# Patient Record
Sex: FEMALE | Race: WHITE | NOT HISPANIC OR LATINO | Employment: PART TIME | URBAN - METROPOLITAN AREA
[De-identification: names, ages, dates, MRNs, and addresses within clinical notes are randomized per-mention and may not be internally consistent; named-entity substitution may affect disease eponyms.]

---

## 2023-02-21 LAB
ALANINE AMINOTRANSFERASE (SGPT) (U/L) IN SER/PLAS: 21 U/L (ref 7–45)
ALBUMIN (G/DL) IN SER/PLAS: 4.1 G/DL (ref 3.4–5)
ALKALINE PHOSPHATASE (U/L) IN SER/PLAS: 56 U/L (ref 33–136)
ANION GAP IN SER/PLAS: 11 MMOL/L (ref 10–20)
ASPARTATE AMINOTRANSFERASE (SGOT) (U/L) IN SER/PLAS: 22 U/L (ref 9–39)
BILIRUBIN TOTAL (MG/DL) IN SER/PLAS: 0.4 MG/DL (ref 0–1.2)
CALCIDIOL (25 OH VITAMIN D3) (NG/ML) IN SER/PLAS: 36 NG/ML
CALCIUM (MG/DL) IN SER/PLAS: 9.6 MG/DL (ref 8.6–10.6)
CARBON DIOXIDE, TOTAL (MMOL/L) IN SER/PLAS: 30 MMOL/L (ref 21–32)
CHLORIDE (MMOL/L) IN SER/PLAS: 107 MMOL/L (ref 98–107)
CHOLESTEROL (MG/DL) IN SER/PLAS: 165 MG/DL (ref 0–199)
CHOLESTEROL IN HDL (MG/DL) IN SER/PLAS: 67 MG/DL
CHOLESTEROL/HDL RATIO: 2.5
CREATININE (MG/DL) IN SER/PLAS: 0.71 MG/DL (ref 0.5–1.05)
GFR FEMALE: >90 ML/MIN/1.73M2
GLUCOSE (MG/DL) IN SER/PLAS: 117 MG/DL (ref 74–99)
LDL: 80 MG/DL (ref 0–99)
POTASSIUM (MMOL/L) IN SER/PLAS: 4.6 MMOL/L (ref 3.5–5.3)
PROTEIN TOTAL: 6.6 G/DL (ref 6.4–8.2)
SODIUM (MMOL/L) IN SER/PLAS: 143 MMOL/L (ref 136–145)
TRIGLYCERIDE (MG/DL) IN SER/PLAS: 90 MG/DL (ref 0–149)
UREA NITROGEN (MG/DL) IN SER/PLAS: 11 MG/DL (ref 6–23)
VLDL: 18 MG/DL (ref 0–40)

## 2023-02-24 LAB — FUNGAL SCREEN, YEAST: NORMAL

## 2023-03-11 DIAGNOSIS — I25.10 CORONARY ARTERY DISEASE DUE TO CALCIFIED CORONARY LESION: Primary | ICD-10-CM

## 2023-03-11 DIAGNOSIS — I25.84 CORONARY ARTERY DISEASE DUE TO CALCIFIED CORONARY LESION: Primary | ICD-10-CM

## 2023-03-13 PROBLEM — R82.998 LEUKOCYTES IN URINE: Status: ACTIVE | Noted: 2023-03-13

## 2023-03-13 PROBLEM — L08.9 BACTERIAL SKIN INFECTION: Status: ACTIVE | Noted: 2023-03-13

## 2023-03-13 PROBLEM — R13.12 DYSPHAGIA, OROPHARYNGEAL PHASE: Status: ACTIVE | Noted: 2023-03-13

## 2023-03-13 PROBLEM — K64.8 INTERNAL HEMORRHOIDS: Status: ACTIVE | Noted: 2023-03-13

## 2023-03-13 PROBLEM — K62.89 RECTAL IRRITATION: Status: ACTIVE | Noted: 2023-03-13

## 2023-03-13 PROBLEM — Q67.8 ASYMMETRY OF CHEST: Status: ACTIVE | Noted: 2023-03-13

## 2023-03-13 PROBLEM — R05.9 COUGH: Status: ACTIVE | Noted: 2023-03-13

## 2023-03-13 PROBLEM — N39.46 MIXED STRESS AND URGE URINARY INCONTINENCE: Status: ACTIVE | Noted: 2023-03-13

## 2023-03-13 PROBLEM — K76.0 FATTY LIVER: Status: ACTIVE | Noted: 2023-03-13

## 2023-03-13 PROBLEM — R00.2 PALPITATIONS: Status: ACTIVE | Noted: 2023-03-13

## 2023-03-13 PROBLEM — E55.9 VITAMIN D DEFICIENCY: Status: ACTIVE | Noted: 2023-03-13

## 2023-03-13 PROBLEM — G43.909 HEADACHE, MIGRAINE: Status: ACTIVE | Noted: 2023-03-13

## 2023-03-13 PROBLEM — K29.70 GASTRITIS: Status: ACTIVE | Noted: 2023-03-13

## 2023-03-13 PROBLEM — R31.1 BENIGN MICROSCOPIC HEMATURIA: Status: ACTIVE | Noted: 2023-03-13

## 2023-03-13 PROBLEM — R43.9 DISTURBANCES OF SENSATION OF SMELL AND TASTE: Status: ACTIVE | Noted: 2023-03-13

## 2023-03-13 PROBLEM — R32 URINARY LEAKAGE: Status: ACTIVE | Noted: 2023-03-13

## 2023-03-13 PROBLEM — K57.10 DUODENAL DIVERTICULUM: Status: ACTIVE | Noted: 2023-03-13

## 2023-03-13 PROBLEM — N91.2 AMENORRHEA: Status: ACTIVE | Noted: 2023-03-13

## 2023-03-13 PROBLEM — Z86.16 HISTORY OF COVID-19: Status: ACTIVE | Noted: 2023-03-13

## 2023-03-13 PROBLEM — R10.11 ABDOMINAL PAIN, RUQ: Status: ACTIVE | Noted: 2023-03-13

## 2023-03-13 PROBLEM — G56.03 BILATERAL CARPAL TUNNEL SYNDROME: Status: ACTIVE | Noted: 2023-03-13

## 2023-03-13 PROBLEM — R13.10 ODYNOPHAGIA: Status: ACTIVE | Noted: 2023-03-13

## 2023-03-13 PROBLEM — K63.5 COLON POLYP: Status: ACTIVE | Noted: 2023-03-13

## 2023-03-13 PROBLEM — B37.89 RECTAL CANDIDIASIS: Status: ACTIVE | Noted: 2023-03-13

## 2023-03-13 PROBLEM — B37.0 ORAL CANDIDIASIS: Status: ACTIVE | Noted: 2023-03-13

## 2023-03-13 PROBLEM — K44.9 HIATAL HERNIA: Status: ACTIVE | Noted: 2023-03-13

## 2023-03-13 PROBLEM — K64.4 EXTERNAL HEMORRHOID: Status: ACTIVE | Noted: 2023-03-13

## 2023-03-13 PROBLEM — L73.9 FOLLICULITIS: Status: ACTIVE | Noted: 2023-03-13

## 2023-03-13 PROBLEM — R10.13 ABDOMINAL DISCOMFORT, EPIGASTRIC: Status: ACTIVE | Noted: 2023-03-13

## 2023-03-13 PROBLEM — E78.5 HYPERLIPIDEMIA: Status: ACTIVE | Noted: 2023-03-13

## 2023-03-13 PROBLEM — E66.01 MORBID OBESITY (MULTI): Status: ACTIVE | Noted: 2023-03-13

## 2023-03-13 PROBLEM — M85.80 BORDERLINE OSTEOPENIA: Status: ACTIVE | Noted: 2023-03-13

## 2023-03-13 PROBLEM — R19.00 LEFT FLANK MASS: Status: ACTIVE | Noted: 2023-03-13

## 2023-03-13 PROBLEM — M54.2 NECK PAIN: Status: ACTIVE | Noted: 2023-03-13

## 2023-03-13 PROBLEM — R20.2 PARESTHESIA OF HAND, BILATERAL: Status: ACTIVE | Noted: 2023-03-13

## 2023-03-13 PROBLEM — J02.9 SORE THROAT: Status: ACTIVE | Noted: 2023-03-13

## 2023-03-13 PROBLEM — K59.00 CONSTIPATION, ACUTE: Status: ACTIVE | Noted: 2023-03-13

## 2023-03-13 PROBLEM — K21.9 ACID REFLUX: Status: ACTIVE | Noted: 2023-03-13

## 2023-03-13 PROBLEM — K43.9 VENTRAL HERNIA: Status: ACTIVE | Noted: 2023-03-13

## 2023-03-13 PROBLEM — B96.89 BACTERIAL SKIN INFECTION: Status: ACTIVE | Noted: 2023-03-13

## 2023-03-13 PROBLEM — E74.39 GLUCOSE INTOLERANCE: Status: ACTIVE | Noted: 2023-03-13

## 2023-03-13 PROBLEM — R63.5 WEIGHT GAIN: Status: ACTIVE | Noted: 2023-03-13

## 2023-03-13 PROBLEM — J45.20 ASTHMA, MILD INTERMITTENT (HHS-HCC): Status: ACTIVE | Noted: 2023-03-13

## 2023-03-13 PROBLEM — I25.10 CAD (CORONARY ARTERY DISEASE): Status: ACTIVE | Noted: 2023-03-13
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
## 2023-03-13 PROBLEM — M85.2 HYPEROSTOSIS FRONTALIS INTERNA: Status: ACTIVE | Noted: 2023-03-13

## 2023-03-13 PROBLEM — K14.6 BURNING MOUTH SYNDROME: Status: ACTIVE | Noted: 2023-03-13

## 2023-03-13 PROBLEM — R09.89 CHOKING EPISODE: Status: ACTIVE | Noted: 2023-03-13

## 2023-03-13 PROBLEM — R14.0 ABDOMINAL BLOATING: Status: ACTIVE | Noted: 2023-03-13

## 2023-03-13 RX ORDER — ROSUVASTATIN CALCIUM 20 MG/1
TABLET, COATED ORAL
Qty: 90 TABLET | Refills: 0 | Status: SHIPPED | OUTPATIENT
Start: 2023-03-13 | End: 2023-06-19

## 2023-04-13 RX ORDER — ESOMEPRAZOLE MAGNESIUM 20 MG/1
2 TABLET, DELAYED RELEASE ORAL DAILY
COMMUNITY
End: 2023-08-04 | Stop reason: WASHOUT

## 2023-04-13 RX ORDER — FREMANEZUMAB-VFRM 225 MG/1.5ML
225 INJECTION SUBCUTANEOUS
COMMUNITY
Start: 2023-03-22

## 2023-04-13 RX ORDER — ALBUTEROL SULFATE 90 UG/1
2 AEROSOL, METERED RESPIRATORY (INHALATION) EVERY 4 HOURS PRN
COMMUNITY
Start: 2015-09-17

## 2023-04-13 RX ORDER — EZETIMIBE 10 MG/1
10 TABLET ORAL DAILY
COMMUNITY
End: 2023-04-17

## 2023-04-13 RX ORDER — CLOTRIMAZOLE AND BETAMETHASONE DIPROPIONATE 10; .64 MG/G; MG/G
CREAM TOPICAL
COMMUNITY
Start: 2023-01-26 | End: 2023-08-04 | Stop reason: WASHOUT

## 2023-04-13 RX ORDER — DESOXIMETASONE 2.5 MG/G
CREAM TOPICAL 2 TIMES DAILY
COMMUNITY
Start: 2022-08-16 | End: 2023-08-04 | Stop reason: WASHOUT

## 2023-04-13 RX ORDER — RIZATRIPTAN BENZOATE 10 MG/1
TABLET ORAL
COMMUNITY

## 2023-04-13 RX ORDER — BUDESONIDE AND FORMOTEROL FUMARATE DIHYDRATE 80; 4.5 UG/1; UG/1
2 AEROSOL RESPIRATORY (INHALATION) AS NEEDED
COMMUNITY

## 2023-04-13 RX ORDER — ALBUTEROL SULFATE 0.83 MG/ML
SOLUTION RESPIRATORY (INHALATION)
COMMUNITY

## 2023-04-13 RX ORDER — CHOLECALCIFEROL (VITAMIN D3) 125 MCG
CAPSULE ORAL DAILY
COMMUNITY
Start: 2020-12-22

## 2023-04-17 DIAGNOSIS — E78.2 MIXED HYPERLIPIDEMIA: Primary | ICD-10-CM

## 2023-04-17 RX ORDER — EZETIMIBE 10 MG/1
TABLET ORAL
Qty: 90 TABLET | Refills: 1 | Status: SHIPPED | OUTPATIENT
Start: 2023-04-17 | End: 2023-10-06 | Stop reason: SDUPTHER

## 2023-04-20 ENCOUNTER — OFFICE VISIT (OUTPATIENT)
Dept: PRIMARY CARE | Facility: CLINIC | Age: 63
End: 2023-04-20
Payer: COMMERCIAL

## 2023-04-20 VITALS
BODY MASS INDEX: 36.81 KG/M2 | DIASTOLIC BLOOD PRESSURE: 70 MMHG | SYSTOLIC BLOOD PRESSURE: 134 MMHG | WEIGHT: 217.8 LBS | TEMPERATURE: 96.5 F | HEART RATE: 88 BPM

## 2023-04-20 DIAGNOSIS — R13.12 DYSPHAGIA, OROPHARYNGEAL PHASE: ICD-10-CM

## 2023-04-20 DIAGNOSIS — J45.20 MILD INTERMITTENT ASTHMA WITHOUT COMPLICATION (HHS-HCC): ICD-10-CM

## 2023-04-20 DIAGNOSIS — R22.0 MILD TONGUE SWELLING: Primary | ICD-10-CM

## 2023-04-20 PROCEDURE — 1036F TOBACCO NON-USER: CPT | Performed by: INTERNAL MEDICINE

## 2023-04-20 PROCEDURE — 87102 FUNGUS ISOLATION CULTURE: CPT

## 2023-04-20 PROCEDURE — 99214 OFFICE O/P EST MOD 30 MIN: CPT | Performed by: INTERNAL MEDICINE

## 2023-04-20 NOTE — PROGRESS NOTES
"Subjective   Patient ID: Cynthia Moran is a 62 y.o. female who presents for Snoring (She also has moments where she feels her tongue is swollen. By the end of the day her throat feels like its tightening.).    She reports she feels her tongue is pressing on back of her throat - feels enlarged. Feels worse by evening but she is not sure if that   No issues breathing.   Started when she had COVID in Nov and pneumonia in January. She associates with doing inhalers - nebulizer with albuterol and Symbicort   Now she just uses as needed - only uses symbicort if she gets a cold  She feels inhalers were hitting the tongue. Last night she could not see the bottom of her uvula.   She does not have issues with swallowing.   Remotely she went to speech therapy for swallowing in 2020 for \"choking episodes\" She also had esophageal dilation. She did learn how to swallow with her food in the middle as she learned from speech therapy.  She does feel pills dont go down quite as easily as right after esophageal dilatation.          Review of Systems   Constitutional:  Negative for activity change, appetite change, chills, diaphoresis, fatigue and fever.   HENT:  Positive for sore throat (irritated) and trouble swallowing. Negative for congestion, ear discharge, ear pain, facial swelling, postnasal drip, rhinorrhea and sinus pressure.    Eyes:  Negative for pain, discharge and itching.   Respiratory:  Negative for cough, chest tightness, shortness of breath and wheezing.    Cardiovascular:  Negative for chest pain, palpitations and leg swelling.   Gastrointestinal:  Negative for abdominal pain, blood in stool, constipation, diarrhea, nausea and vomiting.   Endocrine: Negative for cold intolerance, heat intolerance, polydipsia, polyphagia and polyuria.   Genitourinary:  Negative for difficulty urinating, dysuria, flank pain, frequency and hematuria.   Musculoskeletal:  Positive for arthralgias and back pain. Negative for joint " swelling, myalgias and neck pain.   Skin: Negative.    Neurological:  Positive for headaches. Negative for dizziness, syncope, weakness, light-headedness and numbness.   Hematological:  Negative for adenopathy. Does not bruise/bleed easily.   Psychiatric/Behavioral:  Negative for behavioral problems, confusion, dysphoric mood and suicidal ideas. The patient is not nervous/anxious.        Objective   /70   Pulse 88   Temp 35.8 °C (96.5 °F) (Temporal)   Wt 98.8 kg (217 lb 12.8 oz)   BMI 36.81 kg/m²     Physical Exam  Constitutional:       Appearance: Normal appearance. She is obese.   HENT:      Head: Normocephalic and atraumatic.      Right Ear: External ear normal.      Left Ear: External ear normal.      Nose: Nose normal.      Mouth/Throat:      Mouth: Mucous membranes are moist. No oral lesions or angioedema.      Tongue: No lesions.      Pharynx: Oropharynx is clear. Uvula midline. No uvula swelling.   Cardiovascular:      Rate and Rhythm: Normal rate and regular rhythm.      Pulses: Normal pulses.      Heart sounds: Normal heart sounds.   Pulmonary:      Effort: Pulmonary effort is normal.      Breath sounds: Normal breath sounds.   Musculoskeletal:      Cervical back: Normal range of motion. No tenderness.   Lymphadenopathy:      Cervical: No cervical adenopathy.   Skin:     General: Skin is warm and dry.      Findings: No rash.   Neurological:      General: No focal deficit present.      Mental Status: She is alert and oriented to person, place, and time. Mental status is at baseline.   Psychiatric:         Mood and Affect: Mood is anxious.         Thought Content: Thought content normal.         Judgment: Judgment normal.         Assessment/Plan   Problem List Items Addressed This Visit          Respiratory    Asthma, mild intermittent     History of CAP/ asthma exacerbation January 2023  - clinically much improved   - Continue Symbicort 80-4.5 2 inhalation twice a day, rinse mouth after, albuterol  HFA 2 puffs every 4 hours when necessary  - monitored by Dr Cotter                Digestive    Mild tongue swelling - Primary     Time associates with use of inhalers with COVID/ bacterial pneumonia  No significant tongue swelling noted on exam today but will send again culture for fungus  If negative will refer to ENT         Relevant Orders    Fungal Screen, Yeast (Completed)       Other    Dysphagia, oropharyngeal phase     Subtle issues beginning with dysphagia    EGD with esophageal dilation 5/18/20  Advise follow up with GI Dr Lion

## 2023-04-22 PROBLEM — R22.0 MILD TONGUE SWELLING: Status: ACTIVE | Noted: 2023-04-22

## 2023-04-22 ASSESSMENT — ENCOUNTER SYMPTOMS
NERVOUS/ANXIOUS: 0
FLANK PAIN: 0
NAUSEA: 0
POLYPHAGIA: 0
JOINT SWELLING: 0
ACTIVITY CHANGE: 0
EYE PAIN: 0
MYALGIAS: 0
TROUBLE SWALLOWING: 1
RHINORRHEA: 0
NUMBNESS: 0
CHEST TIGHTNESS: 0
SHORTNESS OF BREATH: 0
BRUISES/BLEEDS EASILY: 0
EYE DISCHARGE: 0
CONFUSION: 0
DIFFICULTY URINATING: 0
EYE ITCHING: 0
DIAPHORESIS: 0
CONSTIPATION: 0
DIARRHEA: 0
FACIAL SWELLING: 0
DYSPHORIC MOOD: 0
FREQUENCY: 0
VOMITING: 0
FATIGUE: 0
BLOOD IN STOOL: 0
ABDOMINAL PAIN: 0
DYSURIA: 0
FEVER: 0
WHEEZING: 0
COUGH: 0
APPETITE CHANGE: 0
BACK PAIN: 1
PALPITATIONS: 0
LIGHT-HEADEDNESS: 0
HEMATURIA: 0
CHILLS: 0
SORE THROAT: 1
ADENOPATHY: 0
SINUS PRESSURE: 0
DIZZINESS: 0
HEADACHES: 1
NECK PAIN: 0
POLYDIPSIA: 0
WEAKNESS: 0
ARTHRALGIAS: 1

## 2023-04-22 NOTE — ASSESSMENT & PLAN NOTE
Subtle issues beginning with dysphagia    EGD with esophageal dilation 5/18/20  Advise follow up with GI Dr Lion

## 2023-04-22 NOTE — ASSESSMENT & PLAN NOTE
Time associates with use of inhalers with COVID/ bacterial pneumonia  No significant tongue swelling noted on exam today but will send again culture for fungus  If negative will refer to ENT

## 2023-04-22 NOTE — ASSESSMENT & PLAN NOTE
History of CAP/ asthma exacerbation January 2023  - clinically much improved   - Continue Symbicort 80-4.5 2 inhalation twice a day, rinse mouth after, albuterol HFA 2 puffs every 4 hours when necessary  - monitored by Dr Cotter

## 2023-04-24 LAB — FUNGAL SCREEN, YEAST: NORMAL

## 2023-04-26 LAB
ESTIMATED AVERAGE GLUCOSE FOR HBA1C: NORMAL
HEMOGLOBIN A1C/HEMOGLOBIN TOTAL IN BLOOD: NORMAL
HGB A1C-DATA CONVERSION: NORMAL %

## 2023-06-17 DIAGNOSIS — I25.84 CORONARY ARTERY DISEASE DUE TO CALCIFIED CORONARY LESION: ICD-10-CM

## 2023-06-17 DIAGNOSIS — I25.10 CORONARY ARTERY DISEASE DUE TO CALCIFIED CORONARY LESION: ICD-10-CM

## 2023-06-19 PROBLEM — G89.29 CHRONIC PAIN OF RIGHT KNEE: Status: ACTIVE | Noted: 2021-07-21

## 2023-06-19 PROBLEM — M17.11 PRIMARY OSTEOARTHRITIS OF RIGHT KNEE: Status: ACTIVE | Noted: 2021-07-21

## 2023-06-19 PROBLEM — M25.561 CHRONIC PAIN OF RIGHT KNEE: Status: ACTIVE | Noted: 2021-07-21

## 2023-06-19 RX ORDER — ROSUVASTATIN CALCIUM 20 MG/1
TABLET, COATED ORAL
Qty: 90 TABLET | Refills: 0 | Status: SHIPPED | OUTPATIENT
Start: 2023-06-19 | End: 2023-09-20

## 2023-08-04 ENCOUNTER — OFFICE VISIT (OUTPATIENT)
Dept: PRIMARY CARE | Facility: CLINIC | Age: 63
End: 2023-08-04
Payer: COMMERCIAL

## 2023-08-04 VITALS
WEIGHT: 194.8 LBS | SYSTOLIC BLOOD PRESSURE: 120 MMHG | DIASTOLIC BLOOD PRESSURE: 64 MMHG | BODY MASS INDEX: 33.44 KG/M2 | HEART RATE: 80 BPM

## 2023-08-04 DIAGNOSIS — R35.0 URINARY FREQUENCY: ICD-10-CM

## 2023-08-04 DIAGNOSIS — R09.A2 SENSATION OF LUMP IN THROAT: ICD-10-CM

## 2023-08-04 DIAGNOSIS — K13.29 TONGUE PLAQUE: Primary | ICD-10-CM

## 2023-08-04 DIAGNOSIS — R22.0 MILD TONGUE SWELLING: ICD-10-CM

## 2023-08-04 LAB
APPEARANCE, URINE: CLEAR
BILIRUBIN, URINE: NEGATIVE
BLOOD, URINE: ABNORMAL
CALCIUM OXALATE CRYSTALS, URINE: ABNORMAL /HPF
COLOR, URINE: YELLOW
GLUCOSE, URINE: NEGATIVE MG/DL
KETONES, URINE: NEGATIVE MG/DL
LEUKOCYTE ESTERASE, URINE: ABNORMAL
NITRITE, URINE: NEGATIVE
PH, URINE: 5 (ref 5–8)
PROTEIN, URINE: NEGATIVE MG/DL
RBC, URINE: 1 /HPF (ref 0–5)
SPECIFIC GRAVITY, URINE: 1.01 (ref 1–1.03)
UROBILINOGEN, URINE: <2 MG/DL (ref 0–1.9)
WBC, URINE: 3 /HPF (ref 0–5)

## 2023-08-04 PROCEDURE — 1036F TOBACCO NON-USER: CPT | Performed by: INTERNAL MEDICINE

## 2023-08-04 PROCEDURE — 99214 OFFICE O/P EST MOD 30 MIN: CPT | Performed by: INTERNAL MEDICINE

## 2023-08-04 PROCEDURE — 81001 URINALYSIS AUTO W/SCOPE: CPT

## 2023-08-04 PROCEDURE — 87102 FUNGUS ISOLATION CULTURE: CPT

## 2023-08-04 PROCEDURE — 87086 URINE CULTURE/COLONY COUNT: CPT

## 2023-08-04 PROCEDURE — 87077 CULTURE AEROBIC IDENTIFY: CPT

## 2023-08-04 RX ORDER — ESOMEPRAZOLE MAGNESIUM 40 MG/1
1 CAPSULE, DELAYED RELEASE ORAL DAILY
COMMUNITY
Start: 2023-04-17 | End: 2023-11-14 | Stop reason: SDUPTHER

## 2023-08-04 NOTE — PROGRESS NOTES
"Subjective   Patient ID: Cynthia Moran is a 62 y.o. female who presents for UTI (Symptoms started 1 week ago. Saw discoloration on toilet paper, different sensation when urinating.) and Mass (Feels like she has lump in throat for 1 year.).    She scheduled appointment for a sensation of \"a lump in her throat\"   She feels as if her tongue is swollen/ enlarged and presses on roof of her mouth since she did a lot of inhalers with the COVID.   At the last visit checked for oral fungal which was negative. Denies difficulty swallowing. Does not feel food gets stuck anymore since esophageal dilatation.   She notes on right side of her neck she feels a lump. She noted white mucous in the back of her throat.   She cough on way here with clear phlegm. She doesn't feel the issue of her throat as much since she coughed this phlegm up.   She inhaled smoke and felt burning down her chest.     She had esophageal manometry 6/22/20 with Dr Lion - normal with small hiatal hernia  /20 with Dr Serrano - Normal except mild antral gastritis, 2 cm hiatal hernia, nonbleeding duodenal diverticulum. Esophagus dilated.    She also reports she is developing symptoms concerning for a UTI. She just noted urinary frequency in the last day. No hematuria/ flank or abdominal pain/ fever.    She has been overwhelmed lately. Her  has been diagnosed with a glioblastoma and will be coming home from rehab this upcoming Monday.   She has good social support with her children and friends.     UTI   Associated symptoms include frequency. Pertinent negatives include no chills, flank pain, hematuria, nausea or vomiting.        Review of Systems   Constitutional:  Positive for fatigue. Negative for activity change, appetite change, chills, diaphoresis and fever.   HENT:  Positive for postnasal drip. Negative for trouble swallowing.    Respiratory:  Negative for cough, chest tightness, shortness of breath and wheezing.    Cardiovascular:  " Negative for palpitations and leg swelling.   Gastrointestinal:  Negative for abdominal pain, nausea and vomiting.   Genitourinary:  Positive for frequency. Negative for difficulty urinating, flank pain and hematuria.   Neurological:  Negative for dizziness, tremors, syncope, facial asymmetry and light-headedness.   Psychiatric/Behavioral:  Positive for dysphoric mood. Negative for agitation, behavioral problems, confusion and suicidal ideas. The patient is nervous/anxious.        Objective   /64 (BP Location: Right arm, Patient Position: Sitting)   Pulse 80   Wt 88.4 kg (194 lb 12.8 oz)   BMI 33.44 kg/m²     Physical Exam  Constitutional:       Appearance: Normal appearance. She is obese.   HENT:      Head: Normocephalic and atraumatic.      Right Ear: Tympanic membrane, ear canal and external ear normal.      Left Ear: Tympanic membrane, ear canal and external ear normal.      Mouth/Throat:      Mouth: Mucous membranes are moist. No oral lesions.      Tongue: No lesions. Tongue does not deviate from midline.      Pharynx: Oropharynx is clear. No oropharyngeal exudate, posterior oropharyngeal erythema or uvula swelling.      Comments: +coating on tongue  Eyes:      Pupils: Pupils are equal, round, and reactive to light.   Neck:      Vascular: No carotid bruit.   Cardiovascular:      Rate and Rhythm: Normal rate and regular rhythm.      Pulses: Normal pulses.   Pulmonary:      Effort: Pulmonary effort is normal.      Breath sounds: Normal breath sounds. No wheezing, rhonchi or rales.   Abdominal:      General: Bowel sounds are normal.      Palpations: Abdomen is soft. There is no mass.      Tenderness: There is no abdominal tenderness. There is no right CVA tenderness or left CVA tenderness.   Musculoskeletal:      Cervical back: Neck supple. No rigidity or tenderness.   Lymphadenopathy:      Cervical: No cervical adenopathy.   Skin:     General: Skin is warm and dry.   Neurological:      General: No focal  "deficit present.      Mental Status: She is alert.   Psychiatric:         Mood and Affect: Mood is anxious and depressed. Affect is tearful.         Behavior: Behavior normal.         Cognition and Memory: Cognition normal.         Assessment/Plan   Problem List Items Addressed This Visit       Mild tongue swelling     Time associates with use of inhalers with COVID/ bacterial pneumonia  No significant tongue swelling causing obstruction noted on exam today but will send again culture for fungus  Will also at this point refer to ENT         Tongue plaque - Primary    Relevant Orders    Fungal Screen, Yeast    Urinary frequency     UA with reflex culture ordered          Relevant Orders    Urinalysis with Reflex Microscopic and Culture    Urinalysis Microscopic Only (Completed)    Sensation of lump in throat     No obvious mass on exam  Will check thyroid US  Will also refer to ENT due to this and sensation of \"tongue enlargement\"   Will also refer for EGD based on oral culture          Relevant Orders    Referral to ENT    US thyroid          "

## 2023-08-05 PROBLEM — R35.0 URINARY FREQUENCY: Status: ACTIVE | Noted: 2023-08-05

## 2023-08-05 PROBLEM — R82.998 CALCIUM OXALATE CRYSTALS IN URINE: Status: ACTIVE | Noted: 2023-08-05

## 2023-08-05 PROBLEM — K13.29 TONGUE PLAQUE: Status: ACTIVE | Noted: 2023-08-05

## 2023-08-05 PROBLEM — R09.A2 SENSATION OF LUMP IN THROAT: Status: ACTIVE | Noted: 2023-08-05

## 2023-08-05 LAB — URINE CULTURE: NORMAL

## 2023-08-05 ASSESSMENT — ENCOUNTER SYMPTOMS
DIZZINESS: 0
WHEEZING: 0
ABDOMINAL PAIN: 0
FATIGUE: 1
DIAPHORESIS: 0
ACTIVITY CHANGE: 0
LIGHT-HEADEDNESS: 0
TROUBLE SWALLOWING: 0
FEVER: 0
NAUSEA: 0
APPETITE CHANGE: 0
FACIAL ASYMMETRY: 0
DYSPHORIC MOOD: 1
SHORTNESS OF BREATH: 0
CHEST TIGHTNESS: 0
DIFFICULTY URINATING: 0
FREQUENCY: 1
PALPITATIONS: 0
CONFUSION: 0
NERVOUS/ANXIOUS: 1
COUGH: 0
VOMITING: 0
CHILLS: 0
TREMORS: 0
AGITATION: 0
FLANK PAIN: 0
HEMATURIA: 0

## 2023-08-05 NOTE — ASSESSMENT & PLAN NOTE
"No obvious mass on exam  Will check thyroid US  Will also refer to ENT due to this and sensation of \"tongue enlargement\"   Will also refer for EGD based on oral culture   "

## 2023-08-05 NOTE — ASSESSMENT & PLAN NOTE
Time associates with use of inhalers with COVID/ bacterial pneumonia  No significant tongue swelling causing obstruction noted on exam today but will send again culture for fungus  Will also at this point refer to ENT

## 2023-08-08 ENCOUNTER — TELEPHONE (OUTPATIENT)
Dept: PRIMARY CARE | Facility: CLINIC | Age: 63
End: 2023-08-08

## 2023-08-08 ENCOUNTER — APPOINTMENT (OUTPATIENT)
Dept: PRIMARY CARE | Facility: CLINIC | Age: 63
End: 2023-08-08
Payer: COMMERCIAL

## 2023-08-08 LAB — FUNGAL SCREEN, YEAST: ABNORMAL

## 2023-08-08 NOTE — TELEPHONE ENCOUNTER
----- Message from Venus Constantino MD sent at 8/6/2023  9:24 PM EDT -----  Urine culture negative

## 2023-08-08 NOTE — TELEPHONE ENCOUNTER
----- Message from Venus Constantino MD sent at 8/5/2023  9:35 AM EDT -----  Urinalysis abnormal.  Awaiting culture.  Also on microscopic calcium oxalate crystals noted.  I do not have any history noted of nephrolithiasis but could consider to evaluate for stones or could repeat microscopic urinalysis

## 2023-08-08 NOTE — TELEPHONE ENCOUNTER
Informed patient of results for urinalysis/culture.  Patient wants to re-check microscopic urinalysis.

## 2023-08-08 NOTE — TELEPHONE ENCOUNTER
Informed patient of urinalysis and urine culture results.  Patient will just come in to re-check a microscopic urinalysis.

## 2023-08-09 DIAGNOSIS — B37.0 ORAL CANDIDIASIS: Primary | ICD-10-CM

## 2023-08-09 RX ORDER — NYSTATIN 100000 [USP'U]/ML
5 SUSPENSION ORAL 4 TIMES DAILY
Qty: 280 ML | Refills: 0 | Status: SHIPPED | OUTPATIENT
Start: 2023-08-09 | End: 2023-08-23

## 2023-08-13 PROBLEM — E04.1 RIGHT THYROID NODULE: Status: ACTIVE | Noted: 2023-08-13

## 2023-08-18 ENCOUNTER — CLINICAL SUPPORT (OUTPATIENT)
Dept: PRIMARY CARE | Facility: CLINIC | Age: 63
End: 2023-08-18
Payer: COMMERCIAL

## 2023-08-18 DIAGNOSIS — R82.998 LEUKOCYTES IN URINE: ICD-10-CM

## 2023-08-18 DIAGNOSIS — R10.13 ABDOMINAL DISCOMFORT, EPIGASTRIC: ICD-10-CM

## 2023-08-18 LAB
APPEARANCE, URINE: CLEAR
BILIRUBIN, URINE: NEGATIVE
BLOOD, URINE: ABNORMAL
COLOR, URINE: YELLOW
GLUCOSE, URINE: NEGATIVE MG/DL
HYALINE CASTS, URINE: ABNORMAL /LPF
KETONES, URINE: NEGATIVE MG/DL
LEUKOCYTE ESTERASE, URINE: ABNORMAL
MUCUS, URINE: ABNORMAL /LPF
NITRITE, URINE: NEGATIVE
PH, URINE: 5 (ref 5–8)
PROTEIN, URINE: NEGATIVE MG/DL
RBC, URINE: <1 /HPF (ref 0–5)
SPECIFIC GRAVITY, URINE: 1.02 (ref 1–1.03)
SQUAMOUS EPITHELIAL CELLS, URINE: <1 /HPF
UROBILINOGEN, URINE: <2 MG/DL (ref 0–1.9)
WBC, URINE: 2 /HPF (ref 0–5)

## 2023-08-18 PROCEDURE — 81001 URINALYSIS AUTO W/SCOPE: CPT

## 2023-08-18 PROCEDURE — 87086 URINE CULTURE/COLONY COUNT: CPT

## 2023-08-21 LAB — URINE CULTURE: NORMAL

## 2023-08-29 ENCOUNTER — TELEPHONE (OUTPATIENT)
Dept: PRIMARY CARE | Facility: CLINIC | Age: 63
End: 2023-08-29
Payer: COMMERCIAL

## 2023-08-29 DIAGNOSIS — B37.0 ORAL CANDIDIASIS: Primary | ICD-10-CM

## 2023-08-29 RX ORDER — FLUCONAZOLE 150 MG/1
150 TABLET ORAL ONCE
Qty: 1 TABLET | Refills: 0 | Status: SHIPPED | OUTPATIENT
Start: 2023-08-29 | End: 2023-08-29

## 2023-08-29 NOTE — TELEPHONE ENCOUNTER
Patient is forgetting to use the swish and swallow due to having to remember her husbands med too. Asking if you can give her a pill,       AIDA ambrosio Hts

## 2023-09-20 DIAGNOSIS — I25.10 CORONARY ARTERY DISEASE DUE TO CALCIFIED CORONARY LESION: ICD-10-CM

## 2023-09-20 DIAGNOSIS — I25.84 CORONARY ARTERY DISEASE DUE TO CALCIFIED CORONARY LESION: ICD-10-CM

## 2023-09-20 RX ORDER — ROSUVASTATIN CALCIUM 20 MG/1
TABLET, COATED ORAL
Qty: 90 TABLET | Refills: 0 | Status: SHIPPED | OUTPATIENT
Start: 2023-09-20 | End: 2023-10-06 | Stop reason: SDUPTHER

## 2023-10-04 PROBLEM — R39.89 SUSPECTED UTI: Status: ACTIVE | Noted: 2023-10-04

## 2023-10-04 PROBLEM — R09.A2 GLOBUS SENSATION: Status: ACTIVE | Noted: 2023-10-04

## 2023-10-04 PROBLEM — J35.02 ADENOIDITIS: Status: ACTIVE | Noted: 2023-10-04

## 2023-10-04 PROBLEM — M51.36 LUMBAR DEGENERATIVE DISC DISEASE: Status: ACTIVE | Noted: 2023-10-04

## 2023-10-04 PROBLEM — R73.9 HYPERGLYCEMIA: Status: ACTIVE | Noted: 2023-10-04

## 2023-10-04 PROBLEM — K21.9 CHRONIC GERD: Status: ACTIVE | Noted: 2023-10-04

## 2023-10-04 PROBLEM — J45.909 ALLERGIC ASTHMA (HHS-HCC): Status: ACTIVE | Noted: 2023-10-04

## 2023-10-04 PROBLEM — J39.2 LESION OF NASOPHARYNX: Status: ACTIVE | Noted: 2023-10-04

## 2023-10-04 PROBLEM — M51.369 LUMBAR DEGENERATIVE DISC DISEASE: Status: ACTIVE | Noted: 2023-10-04

## 2023-10-04 PROBLEM — R30.0 DYSURIA: Status: ACTIVE | Noted: 2023-10-04

## 2023-10-04 PROBLEM — K29.50 ANTRAL GASTRITIS: Status: ACTIVE | Noted: 2023-10-04

## 2023-10-04 PROBLEM — J06.9 INFLAMMATORY DISORDER OF UPPER RESPIRATORY TRACT: Status: ACTIVE | Noted: 2023-10-04

## 2023-10-04 PROBLEM — R39.89 URINARY PROBLEM: Status: ACTIVE | Noted: 2023-10-04

## 2023-10-04 PROBLEM — G89.29 OTHER CHRONIC PAIN: Status: ACTIVE | Noted: 2023-10-04

## 2023-10-04 RX ORDER — IBUPROFEN 400 MG/1
TABLET ORAL EVERY 8 HOURS
COMMUNITY
End: 2023-10-06

## 2023-10-06 ENCOUNTER — OFFICE VISIT (OUTPATIENT)
Dept: PRIMARY CARE | Facility: CLINIC | Age: 63
End: 2023-10-06
Payer: COMMERCIAL

## 2023-10-06 VITALS
DIASTOLIC BLOOD PRESSURE: 85 MMHG | TEMPERATURE: 96.7 F | SYSTOLIC BLOOD PRESSURE: 144 MMHG | BODY MASS INDEX: 34.49 KG/M2 | OXYGEN SATURATION: 98 % | WEIGHT: 202 LBS | HEART RATE: 70 BPM | HEIGHT: 64 IN

## 2023-10-06 DIAGNOSIS — E78.5 HYPERLIPIDEMIA, UNSPECIFIED HYPERLIPIDEMIA TYPE: Primary | ICD-10-CM

## 2023-10-06 DIAGNOSIS — I25.84 CORONARY ARTERY DISEASE DUE TO CALCIFIED CORONARY LESION: ICD-10-CM

## 2023-10-06 DIAGNOSIS — E78.2 MIXED HYPERLIPIDEMIA: ICD-10-CM

## 2023-10-06 DIAGNOSIS — I25.10 CORONARY ARTERY DISEASE DUE TO CALCIFIED CORONARY LESION: ICD-10-CM

## 2023-10-06 PROCEDURE — 99214 OFFICE O/P EST MOD 30 MIN: CPT | Performed by: FAMILY MEDICINE

## 2023-10-06 PROCEDURE — 1036F TOBACCO NON-USER: CPT | Performed by: FAMILY MEDICINE

## 2023-10-06 RX ORDER — ROSUVASTATIN CALCIUM 20 MG/1
20 TABLET, COATED ORAL DAILY
Qty: 90 TABLET | Refills: 2 | Status: SHIPPED | OUTPATIENT
Start: 2023-10-06

## 2023-10-06 RX ORDER — EZETIMIBE 10 MG/1
10 TABLET ORAL DAILY
Qty: 90 TABLET | Refills: 2 | Status: SHIPPED | OUTPATIENT
Start: 2023-10-06

## 2023-10-06 ASSESSMENT — LIFESTYLE VARIABLES
AUDIT-C TOTAL SCORE: 3
HOW OFTEN DO YOU HAVE SIX OR MORE DRINKS ON ONE OCCASION: NEVER
HOW OFTEN DO YOU HAVE A DRINK CONTAINING ALCOHOL: 2-3 TIMES A WEEK
SKIP TO QUESTIONS 9-10: 1
HOW MANY STANDARD DRINKS CONTAINING ALCOHOL DO YOU HAVE ON A TYPICAL DAY: 1 OR 2

## 2023-10-06 ASSESSMENT — ENCOUNTER SYMPTOMS
GASTROINTESTINAL NEGATIVE: 1
CARDIOVASCULAR NEGATIVE: 1
CONSTITUTIONAL NEGATIVE: 1
MUSCULOSKELETAL NEGATIVE: 1
RESPIRATORY NEGATIVE: 1

## 2023-10-06 ASSESSMENT — PATIENT HEALTH QUESTIONNAIRE - PHQ9
SUM OF ALL RESPONSES TO PHQ9 QUESTIONS 1 & 2: 0
1. LITTLE INTEREST OR PLEASURE IN DOING THINGS: NOT AT ALL
2. FEELING DOWN, DEPRESSED OR HOPELESS: NOT AT ALL

## 2023-10-06 ASSESSMENT — COLUMBIA-SUICIDE SEVERITY RATING SCALE - C-SSRS
6. HAVE YOU EVER DONE ANYTHING, STARTED TO DO ANYTHING, OR PREPARED TO DO ANYTHING TO END YOUR LIFE?: NO
1. IN THE PAST MONTH, HAVE YOU WISHED YOU WERE DEAD OR WISHED YOU COULD GO TO SLEEP AND NOT WAKE UP?: NO
2. HAVE YOU ACTUALLY HAD ANY THOUGHTS OF KILLING YOURSELF?: NO

## 2023-10-06 NOTE — PROGRESS NOTES
Subjective   Patient ID: Cynthia Moran is a 62 y.o. female who presents for Follow-up (Cholesterol ).  HPI  Review pmshx no acute process  Review of Systems   Constitutional: Negative.    HENT: Negative.     Respiratory: Negative.     Cardiovascular: Negative.    Gastrointestinal: Negative.    Genitourinary: Negative.    Musculoskeletal: Negative.        Objective   Physical Exam  General no acute process no icterus well-hydrated alert active oriented    HEENT normocephalic no palpable tenderness eyes pupils equal reactive light and accommodation extraocular muscles intact no icterus and/or erythema ears benign external auditory canal no gross deformities nose no discharge drainage erythema bleeding throat no erythema.    Heart regular rate and rhythm without S3-S4 or murmur    Lungs clear to auscultation x2 no rales or rhonchi    Abdomen soft nontender nondistended no palpable masses no organomegaly splenomegaly.    Integument no rash no lumps bumps or concerning lesions.    Neurologic no tics tremors or seizures no decreased range of motion or ataxia.    Musculoskeletal good range of motion no gross abnormalities noted  Assessment/Plan

## 2023-10-13 ENCOUNTER — APPOINTMENT (OUTPATIENT)
Dept: PRIMARY CARE | Facility: CLINIC | Age: 63
End: 2023-10-13
Payer: COMMERCIAL

## 2023-11-14 ENCOUNTER — TELEPHONE (OUTPATIENT)
Dept: PRIMARY CARE | Facility: CLINIC | Age: 63
End: 2023-11-14

## 2023-11-14 DIAGNOSIS — K29.40 ATROPHIC GASTRITIS WITHOUT HEMORRHAGE: Primary | ICD-10-CM

## 2023-11-14 RX ORDER — ESOMEPRAZOLE MAGNESIUM 40 MG/1
40 CAPSULE, DELAYED RELEASE ORAL DAILY
Qty: 90 CAPSULE | Refills: 0 | Status: SHIPPED | OUTPATIENT
Start: 2023-11-14 | End: 2024-02-16

## 2023-11-14 NOTE — TELEPHONE ENCOUNTER
Rx Refill Request Telephone Encounter    Name:  Cynthia Moran  :  [id number]  Medication Name:  Esomeprazole  Dose : 40 mg DR  Route : oral  Frequency : once daily  Quantity : 90  Directions : [facility]   Specific Pharmacy location:  [facility]  Date of last appointment:  10-06-23  Date of next appointment:  none  Best number to reach patient:  [phone]

## 2024-02-26 ENCOUNTER — OFFICE VISIT (OUTPATIENT)
Dept: PRIMARY CARE | Facility: CLINIC | Age: 64
End: 2024-02-26
Payer: COMMERCIAL

## 2024-02-26 VITALS
HEART RATE: 65 BPM | WEIGHT: 219 LBS | BODY MASS INDEX: 37.39 KG/M2 | DIASTOLIC BLOOD PRESSURE: 89 MMHG | SYSTOLIC BLOOD PRESSURE: 156 MMHG | OXYGEN SATURATION: 94 % | HEIGHT: 64 IN

## 2024-02-26 DIAGNOSIS — F41.9 ANXIETY: Primary | ICD-10-CM

## 2024-02-26 DIAGNOSIS — R35.89 POLYURIA: ICD-10-CM

## 2024-02-26 LAB
POC APPEARANCE, URINE: CLEAR
POC BILIRUBIN, URINE: NEGATIVE
POC BLOOD, URINE: ABNORMAL
POC COLOR, URINE: YELLOW
POC GLUCOSE, URINE: NEGATIVE MG/DL
POC KETONES, URINE: NEGATIVE MG/DL
POC LEUKOCYTES, URINE: NEGATIVE
POC NITRITE,URINE: NEGATIVE
POC PH, URINE: 5.5 PH
POC PROTEIN, URINE: NEGATIVE MG/DL
POC SPECIFIC GRAVITY, URINE: >=1.03
POC UROBILINOGEN, URINE: 0.2 EU/DL

## 2024-02-26 PROCEDURE — 81002 URINALYSIS NONAUTO W/O SCOPE: CPT | Performed by: FAMILY MEDICINE

## 2024-02-26 PROCEDURE — 99213 OFFICE O/P EST LOW 20 MIN: CPT | Performed by: FAMILY MEDICINE

## 2024-02-26 PROCEDURE — 1036F TOBACCO NON-USER: CPT | Performed by: FAMILY MEDICINE

## 2024-02-26 ASSESSMENT — ENCOUNTER SYMPTOMS
MUSCULOSKELETAL NEGATIVE: 1
CONSTITUTIONAL NEGATIVE: 1
RESPIRATORY NEGATIVE: 1
NEUROLOGICAL NEGATIVE: 1
CARDIOVASCULAR NEGATIVE: 1
FREQUENCY: 1

## 2024-02-26 NOTE — PROGRESS NOTES
Subjective   Patient ID: Cynthia Moran is a 63 y.o. female who presents for Follow-up (Patient needs a return to work letter).  HPI  Patient has a history of recently losing her  some depression and anxiety we talked about support structures she is feels she is well supported at home.  At this point were not can start her on any medication she is to return to work in a month and a half or so she is to try part-time first and see if she can get back to work on a regular basis.  Review of Systems   Constitutional: Negative.    HENT: Negative.     Respiratory: Negative.     Cardiovascular: Negative.    Genitourinary:  Positive for frequency.   Musculoskeletal: Negative.    Neurological: Negative.        Objective   Physical Exam  General no acute process no icterus well-hydrated alert active oriented    HEENT normocephalic no palpable tenderness eyes pupils equal reactive light and accommodation extraocular muscles intact no icterus and/or erythema ears benign external auditory canal no gross deformities nose no discharge drainage erythema bleeding throat no erythema.    Heart regular rate and rhythm without S3-S4 or murmur    Lungs clear to auscultation x2 no rales or rhonchi    Abdomen soft nontender nondistended no palpable masses no organomegaly splenomegaly.    Integument no rash no lumps bumps or concerning lesions.    Neurologic no tics tremors or seizures no decreased range of motion or ataxia.    Musculoskeletal good range of motion no gross abnormalities noted  Assessment/Plan            Lauro Oswald DO 02/26/24 3:36 PM

## 2024-02-29 ENCOUNTER — OFFICE VISIT (OUTPATIENT)
Dept: OTOLARYNGOLOGY | Facility: CLINIC | Age: 64
End: 2024-02-29
Payer: COMMERCIAL

## 2024-02-29 VITALS
SYSTOLIC BLOOD PRESSURE: 144 MMHG | DIASTOLIC BLOOD PRESSURE: 82 MMHG | HEIGHT: 64 IN | TEMPERATURE: 97.4 F | HEART RATE: 69 BPM | WEIGHT: 219 LBS | BODY MASS INDEX: 37.39 KG/M2

## 2024-02-29 DIAGNOSIS — R09.A2 GLOBUS SENSATION: ICD-10-CM

## 2024-02-29 DIAGNOSIS — R09.89 PHLEGM IN THROAT: ICD-10-CM

## 2024-02-29 DIAGNOSIS — R68.2 DRY MOUTH: ICD-10-CM

## 2024-02-29 DIAGNOSIS — R22.0 TONGUE SWELLING: Primary | ICD-10-CM

## 2024-02-29 PROCEDURE — 99213 OFFICE O/P EST LOW 20 MIN: CPT | Performed by: NURSE PRACTITIONER

## 2024-02-29 PROCEDURE — 31575 DIAGNOSTIC LARYNGOSCOPY: CPT | Performed by: NURSE PRACTITIONER

## 2024-02-29 PROCEDURE — 1036F TOBACCO NON-USER: CPT | Performed by: NURSE PRACTITIONER

## 2024-02-29 RX ORDER — MAGNESIUM CARB/ALUMINUM HYDROX 105-160MG
TABLET,CHEWABLE ORAL
Qty: 120 TABLET | Refills: 0 | Status: SHIPPED | OUTPATIENT
Start: 2024-02-29 | End: 2024-04-02

## 2024-02-29 RX ORDER — MAGNESIUM OXIDE 200 MG
TABLET,CHEWABLE ORAL
COMMUNITY
End: 2024-04-02

## 2024-02-29 SDOH — ECONOMIC STABILITY: FOOD INSECURITY: WITHIN THE PAST 12 MONTHS, YOU WORRIED THAT YOUR FOOD WOULD RUN OUT BEFORE YOU GOT MONEY TO BUY MORE.: NEVER TRUE

## 2024-02-29 SDOH — ECONOMIC STABILITY: FOOD INSECURITY: WITHIN THE PAST 12 MONTHS, THE FOOD YOU BOUGHT JUST DIDN'T LAST AND YOU DIDN'T HAVE MONEY TO GET MORE.: NEVER TRUE

## 2024-02-29 ASSESSMENT — PATIENT HEALTH QUESTIONNAIRE - PHQ9
2. FEELING DOWN, DEPRESSED OR HOPELESS: NOT AT ALL
SUM OF ALL RESPONSES TO PHQ9 QUESTIONS 1 AND 2: 0
1. LITTLE INTEREST OR PLEASURE IN DOING THINGS: NOT AT ALL

## 2024-02-29 ASSESSMENT — ENCOUNTER SYMPTOMS
DEPRESSION: 0
LOSS OF SENSATION IN FEET: 0
OCCASIONAL FEELINGS OF UNSTEADINESS: 0

## 2024-02-29 ASSESSMENT — COLUMBIA-SUICIDE SEVERITY RATING SCALE - C-SSRS
2. HAVE YOU ACTUALLY HAD ANY THOUGHTS OF KILLING YOURSELF?: NO
1. IN THE PAST MONTH, HAVE YOU WISHED YOU WERE DEAD OR WISHED YOU COULD GO TO SLEEP AND NOT WAKE UP?: NO
6. HAVE YOU EVER DONE ANYTHING, STARTED TO DO ANYTHING, OR PREPARED TO DO ANYTHING TO END YOUR LIFE?: NO

## 2024-02-29 ASSESSMENT — PAIN SCALES - GENERAL: PAINLEVEL: 0-NO PAIN

## 2024-02-29 NOTE — PROGRESS NOTES
Subjective   Patient ID: Cynthia Moran is a 63 y.o. female who presents for LUMP IN THROAT.    HPI  INITIAL VISIT 9/6/2023:  Feels she has a lump in the throat that has been there for years. She has asthma and when she had COVID she was using a lot of inhalers. This happened the following year as well. In January she has pneumonia. When she was doing the inhalers she thought her tongue was swollen and that she has a lot of mucous in the back of her throat. The mucous is clear after about 2 months ago she coughed up some. It is worse at night. No trouble swallowing. No trouble breathing. No throat pain. When she stressed her voice she gets burning in her chest. She went to Dr. Constantino who did test her positive for strep. She does get burning in the chest at times. She went on Diflucan which helped some. She does get acid reflux and heartburn, has been worse over the past 1-2 months, takes Nexium 20 mg once daily. No voice changes. No fevers, chills, night sweats, or weight loss. Non-smoker.     2/29/2024: Patient here for her throat. She feels the antibiotic helped with some of the pain, but she still feels the mucous in the back of her throat. She has a tonsil stone on the right side. Her symptoms started when she had COVID. Was on a lot of steroids/components.No trouble swallowing. She does feel dry.   Her  recently passed away.     Review of Systems    All other systems have been reviewed and are negative for complaints except for those mentioned in history of present illness, past medical history and problem list.    Objective   Physical Exam    Constitutional: No fever, chills, weight loss or weight gain  General appearance: Appears well, well-nourished, well groomed. No acute distress.    Communication: Normal communication    Psychiatric: Oriented to person, place and time. Normal mood and affect.    Neurologic: Cranial nerves II-XII grossly intact and symmetric bilaterally.    Head and Face:  Head:  Atraumatic with no masses, lesions or scarring.  Face: Normal symmetry. No scars or deformities.  TMJ: Normal, no trismus.    Eyes: Conjunctiva not edematous or erythematous. PERRLA    Right Ear: External inspection of ear with no deformity, scars, or masses. EAC is clear.  TM is intact with no sign of infection, effusion, or retraction.  No perforation seen.     Left Ear: External inspection of ear with no deformity, scars, or masses. EAC is clear.  TM is intact with no sign of infection, effusion, or retraction.  No perforation seen.     Nose: External inspection of nose: No nasal lesions, lacerations or scars. Anterior rhinoscopy with limited visualization past the inferior turbinates. No tenderness on frontal or maxillary sinus palpation.    Oral Cavity/Mouth: Oral cavity and oropharynx mucosa moist and pink. No lesions or masses. Dentition normal. Tonsils appear normal. Uvula is midline. Tongue with no masses or lesions. Tongue with good mobility. The oropharynx is clear.    Neck: Normal appearing, symmetric, trachea midline.     Cardiovascular: Examination of peripheral vascular system shows no clubbing or cyanosis.    Respiratory: No respiratory distress increased work of breathing. Inspection of the chest with symmetric chest expansion and normal respiratory effort.    Skin: No head and neck rashes.    Lymph nodes: No adenopathy.     Laryngoscopy    Date/Time: 2/29/2024 10:28 AM    Performed by: TORY Dietrich  Authorized by: TORY Dietrich    Consent:     Consent obtained:  Verbal  Procedure details:     Medication:  Afrin (4% topical lidocaine)    Instrument: flexible fiberoptic nasal endoscope      Scope location: right nare    Sinus:     Right nasopharynx:       normal      patent    Left nasopharynx:       normal      patent    Right Eustachian tube orifices:       normal      patent    Left Eustachian tube orifices:       normal      patent  Mouth:     Posterior pharyngeal  wall: normal      Oropharynx:       normal      Vallecula:       normal      Base of tongue:       normal      Epiglottis:       normal    Throat:     Right hypopharynx:       normal      Left hypopharynx:       normal      Pyriform sinus:       normal      False vocal cords:       normal      True vocal cords:       normal    Post-procedure details:     Patient tolerance of procedure:  Tolerated well, no immediate complications  Comments:      Mild post cricoid edema.    Assessment/Plan   Diagnoses and all orders for this visit:  Laryngoscopy performed. No evidence of infection. No evidence of nasopharyngeal lesion. Add Gaviscon. Continue Nexium. Follow up with GI, after that we will reassess.   Tongue swelling  Dry mouth  Phlegm in throat  Globus sensation  -     aluminum hydrox-magnesium carb (Gaviscon Extra Strength) 160-105 mg tablet,chewable; Chew 1 tablet after meals and at bedtime.  -     Referral to Gastroenterology; Future    All questions answered to patient satisfaction.        TONA Dietirch-CNP 02/29/24 10:06 AM

## 2024-03-05 ENCOUNTER — DOCUMENTATION (OUTPATIENT)
Dept: OTOLARYNGOLOGY | Facility: CLINIC | Age: 64
End: 2024-03-05
Payer: COMMERCIAL

## 2024-03-05 DIAGNOSIS — R07.0 THROAT BURNING: Primary | ICD-10-CM

## 2024-03-05 RX ORDER — FLUCONAZOLE 100 MG/1
TABLET ORAL
Qty: 8 TABLET | Refills: 0 | Status: SHIPPED | OUTPATIENT
Start: 2024-03-05 | End: 2024-04-02

## 2024-03-05 NOTE — PROGRESS NOTES
Patient calling saying she started taking the Gaviscon and it gave her a lot of burning in her throat. She is concerned this is a fungal infection. Requesting Fluconozole. Sent this to the pharmacy and she will follow up when finished. Also will set up GI appt.

## 2024-04-02 ENCOUNTER — OFFICE VISIT (OUTPATIENT)
Dept: GASTROENTEROLOGY | Facility: HOSPITAL | Age: 64
End: 2024-04-02
Payer: COMMERCIAL

## 2024-04-02 VITALS
BODY MASS INDEX: 37.42 KG/M2 | HEART RATE: 67 BPM | DIASTOLIC BLOOD PRESSURE: 89 MMHG | TEMPERATURE: 95 F | OXYGEN SATURATION: 97 % | SYSTOLIC BLOOD PRESSURE: 140 MMHG | WEIGHT: 218 LBS

## 2024-04-02 DIAGNOSIS — R13.10 ODYNOPHAGIA: ICD-10-CM

## 2024-04-02 DIAGNOSIS — R09.A2 GLOBUS SENSATION: ICD-10-CM

## 2024-04-02 DIAGNOSIS — R13.19 ESOPHAGEAL DYSPHAGIA: Primary | ICD-10-CM

## 2024-04-02 DIAGNOSIS — R12 HEARTBURN: ICD-10-CM

## 2024-04-02 PROCEDURE — 99214 OFFICE O/P EST MOD 30 MIN: CPT | Performed by: NURSE PRACTITIONER

## 2024-04-02 PROCEDURE — 1036F TOBACCO NON-USER: CPT | Performed by: NURSE PRACTITIONER

## 2024-04-02 PROCEDURE — 99204 OFFICE O/P NEW MOD 45 MIN: CPT | Performed by: NURSE PRACTITIONER

## 2024-04-02 SDOH — ECONOMIC STABILITY: FOOD INSECURITY: WITHIN THE PAST 12 MONTHS, THE FOOD YOU BOUGHT JUST DIDN'T LAST AND YOU DIDN'T HAVE MONEY TO GET MORE.: NEVER TRUE

## 2024-04-02 SDOH — ECONOMIC STABILITY: FOOD INSECURITY: WITHIN THE PAST 12 MONTHS, YOU WORRIED THAT YOUR FOOD WOULD RUN OUT BEFORE YOU GOT MONEY TO BUY MORE.: NEVER TRUE

## 2024-04-02 ASSESSMENT — LIFESTYLE VARIABLES
HOW OFTEN DO YOU HAVE A DRINK CONTAINING ALCOHOL: MONTHLY OR LESS
HOW MANY STANDARD DRINKS CONTAINING ALCOHOL DO YOU HAVE ON A TYPICAL DAY: 1 OR 2
AUDIT-C TOTAL SCORE: 2
SKIP TO QUESTIONS 9-10: 0
HOW OFTEN DO YOU HAVE SIX OR MORE DRINKS ON ONE OCCASION: LESS THAN MONTHLY

## 2024-04-02 ASSESSMENT — ENCOUNTER SYMPTOMS
WEAKNESS: 0
ARTHRALGIAS: 0
FATIGUE: 0
LIGHT-HEADEDNESS: 0
BACK PAIN: 0
DYSURIA: 0
SHORTNESS OF BREATH: 0
FREQUENCY: 0
AGITATION: 0
WHEEZING: 0
DIAPHORESIS: 0
NUMBNESS: 0
FEVER: 0
JOINT SWELLING: 0
HALLUCINATIONS: 0
PALPITATIONS: 0
ADENOPATHY: 0
HEMATURIA: 0
COUGH: 0
MYALGIAS: 0
EYE PAIN: 0
CHILLS: 0
PHOTOPHOBIA: 0
SORE THROAT: 0
DIZZINESS: 0
HEADACHES: 0
FLANK PAIN: 0

## 2024-04-02 ASSESSMENT — PATIENT HEALTH QUESTIONNAIRE - PHQ9
SUM OF ALL RESPONSES TO PHQ9 QUESTIONS 1 & 2: 0
2. FEELING DOWN, DEPRESSED OR HOPELESS: NOT AT ALL
1. LITTLE INTEREST OR PLEASURE IN DOING THINGS: NOT AT ALL

## 2024-04-02 ASSESSMENT — PAIN SCALES - GENERAL: PAINLEVEL: 0-NO PAIN

## 2024-04-02 NOTE — LETTER
"April 2, 2024     TONA Dietrich-CNP  [address]  [facility] [address]    Patient: Cynthia Moran   YOB: 1960   Date of Visit: 4/2/2024       Dear TONA Wilcox-CNP:    Thank you for referring Cynthia Moran to me for evaluation. Below are my notes for this consultation.  If you have questions, please do not hesitate to call me. I look forward to following your patient along with you.       Sincerely,     TONA Headley-CNP      CC: No Recipients  ______________________________________________________________________________________    Subjective  Patient ID: Cynthia Moran is a 63 y.o. female who presents for a lump feeling in her throat.     This is a 63 year old WF with history of obesity, migraine headaches, HLD, asthma, and GERD who is presenting to the GI clinic for an initial visit.     History per pt and review of EMR    Of note, she saw Kathy Alvarado CNP in the  GI clinic in 2020.     Reports getting COVID-19 two times (a year apart) within the past few years. Since getting COVID-19, she's been dealing with a globus sensation and a sense of tongue swelling.     On 2/29/24 she  saw ENT where in office laryngoscopy noted mild post cricoid edema, but no mass. She was started on Gaviscon (in addition to her esomeprazole), but she reported mouth burning from it. Out of concern for fungal infection, she was given oral  fluconazole which she reports made her \"50 % better\". She was referred to GI.     Reports heartburn despite taking esomeprazole in the morning (sometimes before eating and sometime after eating).     ROS positive for esophageal dysphagia to solids include peanut butter.     ROS positive for odynophagia.     Reports some recent weight loss which she attributes to not eating as she was caring for her  who recently passed away from a glioblastoma (he was diagnosed on 4/29/23).     She's doing the best she can to cope " with her 's death, but admits to being upset regarding his passing.     Denies abdominal pain, diarrhea, constipation, hematemesis, hematochezia, and melena.     She avoids spicy foods. Does eat chocolate on occasion. Drinks coffee in the morning.     Past medical history:   See above    Past surgical history:   C section x2   Cataract surgery   Left knee arthroscopic surgery    Family history:  No GI cancers    Social history:   Drinks a glass of wine on occasion   Denies use of tobacco  Tried a marijuana gummy recently, but otherwise no illicit drug use   Has 2 sons    [facility] colonoscopy [facility]: one small benign polyp removed and nonbleeding internal hemorrhoids     Hydrogen breath negative for SIBO (2020 [facility])     Esophageal manometry study: normal motility (2020 [facility])     EGD [facility]:   - Normal hypopharynx.   - Normal esophagus. There is no endoscopic evidence of Weiss's esophagus, esophagitis, stricture, ulcerations, varices or mass in the entire esophagus. Biopsies were taken to evaluate for EoE.   - Z-line regular, 35 cm from the incisors.   - 2 cm hiatal hernia.   - Mild antral gastritis. Biopsied  - Incidental non-bleeding duodenal diverticulum. The duodenum was otherwise unremarkable.   - No endoscopic esophageal abnormality to explain patient's dysphagia. Esophagus dilated.     FINAL DIAGNOSIS   A. STOMACH, COLD BIOPSY:   --MILD CHRONIC NONSPECIFIC GASTRITIS, NO HELICOBACTER IDENTIFIED.     B, C. DISTAL AND MID ESOPHAGUS, COLD BIOPSIES:   --SQUAMOUS EPITHELIUM, NO SIGNIFICANT HISTOPATHOLOGICAL ABNORMALITIES.           Review of Systems   Constitutional:  Negative for chills, diaphoresis, fatigue and fever.   HENT:  Negative for congestion, ear pain, hearing loss, sneezing and sore throat.    Eyes:  Negative for photophobia, pain and visual disturbance.   Respiratory:  Negative for cough, shortness of breath and wheezing.    Cardiovascular:  Negative for chest pain, palpitations and leg swelling.    Endocrine: Negative for cold intolerance and heat intolerance.   Genitourinary:  Negative for dysuria, flank pain, frequency and hematuria.   Musculoskeletal:  Negative for arthralgias, back pain, gait problem, joint swelling and myalgias.   Skin:  Negative for rash.   Neurological:  Negative for dizziness, syncope, weakness, light-headedness, numbness and headaches.   Hematological:  Negative for adenopathy.   Psychiatric/Behavioral:  Negative for agitation and hallucinations.        Allergies   Allergen Reactions   • Cefprozil Unknown   • Cephalosporins Hives     ceftin   • Latex Unknown     Current Outpatient Medications   Medication Sig Dispense Refill   • Ajovy Syringe 225 mg/1.5 mL prefilled syringe Inject 1.5 mL (225 mg) under the skin every 30 (thirty) days.     • albuterol 2.5 mg /3 mL (0.083 %) nebulizer solution inhale 3 ml (1 vial) via nebulizer every 6 hours as needed for shortness of breath.     • aspirin 81 mg capsule Take 1 tablet by mouth once daily.     • cholecalciferol (Vitamin D-3) 125 MCG (5000 UT) capsule Take by mouth once daily.     • cyanocobalamin, vitamin B-12, (VITAMIN B-12 ORAL) Take by mouth once daily.     • esomeprazole (NexIUM) 40 mg DR capsule TAKE ONE CAPSULE (40 MG) BY MOUTH ONCE DAILY 90 capsule 0   • ezetimibe (Zetia) 10 mg tablet Take 1 tablet (10 mg) by mouth once daily. 90 tablet 2   • rizatriptan (Maxalt) 10 mg tablet TAKE AT ONSET OF HEADACHE  MAY REPEAT DOSE ONCE  NOT TO EXCEED 2 DAYS PER WEEK.     • rosuvastatin (Crestor) 20 mg tablet Take 1 tablet (20 mg) by mouth once daily. 90 tablet 2   • Symbicort 80-4.5 mcg/actuation inhaler Inhale 2 puffs if needed.     • Ventolin HFA 90 mcg/actuation inhaler Inhale 2 puffs every 4 hours if needed.     • MAGNESIUM OXIDE ORAL Take 250 mg by mouth once daily.       No current facility-administered medications for this visit.        Objective    /89 (BP Location: Right arm, Patient Position: Sitting)   Pulse 67   Temp 35 °C  (95 °F) (Temporal)   Wt 98.9 kg (218 lb)   SpO2 97% Comment: RA  BMI 37.42 kg/m²     Physical Exam  Constitutional:       General: She is not in acute distress.     Appearance: Normal appearance.   HENT:      Head: Normocephalic and atraumatic.   Eyes:      Conjunctiva/sclera: Conjunctivae normal.   Cardiovascular:      Rate and Rhythm: Normal rate and regular rhythm.      Heart sounds: No murmur heard.     No gallop.   Pulmonary:      Effort: Pulmonary effort is normal.      Breath sounds: Normal breath sounds.   Abdominal:      General: Bowel sounds are normal. There is no distension.      Tenderness: There is no abdominal tenderness. There is no guarding.   Musculoskeletal:         General: No swelling or deformity. Normal range of motion.      Cervical back: Normal range of motion. No rigidity.   Skin:     General: Skin is warm and dry.      Coloration: Skin is not jaundiced.      Findings: No lesion or rash.   Neurological:      General: No focal deficit present.      Mental Status: She is alert and oriented to person, place, and time.   Psychiatric:         Mood and Affect: Mood normal.      Comments: Tearful at times when discussing 's death; otherwise normal affect          Assessment/Plan  Problem List Items Addressed This Visit       Odynophagia    Relevant Orders    EGD    Globus sensation    Relevant Orders    EGD     Other Visit Diagnoses       Esophageal dysphagia    -  Primary    Relevant Orders    EGD    Heartburn               Globus sensation, subjective tongue swelling, heartburn, odynophagia, esophageal dysphagia to solids: etiology not entirely clear. History somewhat vague. Potentially multifactorial in nature. Suspect GERD in the setting of improper PPI use is at least somewhat contributing. Also consider esophagitis, peptic stricture and esophageal malignancy with the latter less likely. I could not appreciate any tongue swelling on exam. I do question some functional component to  her symptomatology, especially globus sensation,  in the setting of stress regarding her 's recent death.   - will proceed with EGD   - continue esomeprazole 40 mg once daily (advise to take 30-60 minutes prior to breakfast)  - discuss dietary precautions and weight loss in the management of GERD     2. Colorectal cancer screening:  - recommend screening colonoscopy in 2029    3. Follow up:  - will be based upon the above

## 2024-04-02 NOTE — PROGRESS NOTES
"Subjective   Patient ID: Cynthia Moran is a 63 y.o. female who presents for a lump feeling in her throat.     This is a 63 year old WF with history of obesity, migraine headaches, HLD, asthma, and GERD who is presenting to the GI clinic for an initial visit.     History per pt and review of EMR    Of note, she saw Kathy Alvarado CNP in the  GI clinic in 2020.     Reports getting COVID-19 two times (a year apart) within the past few years. Since getting COVID-19, she's been dealing with a globus sensation and a sense of tongue swelling.     On 2/29/24 she  saw ENT where in office laryngoscopy noted mild post cricoid edema, but no mass. She was started on Gaviscon (in addition to her esomeprazole), but she reported mouth burning from it. Out of concern for fungal infection, she was given oral  fluconazole which she reports made her \"50 % better\". She was referred to GI.     Reports heartburn despite taking esomeprazole in the morning (sometimes before eating and sometime after eating).     ROS positive for esophageal dysphagia to solids include peanut butter.     ROS positive for odynophagia.     Reports some recent weight loss which she attributes to not eating as she was caring for her  who recently passed away from a glioblastoma (he was diagnosed on 4/29/23).     She's doing the best she can to cope with her 's death, but admits to being upset regarding his passing.     Denies abdominal pain, diarrhea, constipation, hematemesis, hematochezia, and melena.     She avoids spicy foods. Does eat chocolate on occasion. Drinks coffee in the morning.     Past medical history:   See above    Past surgical history:   C section x2   Cataract surgery   Left knee arthroscopic surgery    Family history:  No GI cancers    Social history:   Drinks a glass of wine on occasion   Denies use of tobacco  Tried a marijuana gummy recently, but otherwise no illicit drug use   Has 2 sons    Screening colonoscopy 2019 : " one small benign polyp removed and nonbleeding internal hemorrhoids     Hydrogen breath negative for SIBO (2020 [facility])     Esophageal manometry study: normal motility (2020 [facility])     EGD [facility]:   - Normal hypopharynx.   - Normal esophagus. There is no endoscopic evidence of Weiss's esophagus, esophagitis, stricture, ulcerations, varices or mass in the entire esophagus. Biopsies were taken to evaluate for EoE.   - Z-line regular, 35 cm from the incisors.   - 2 cm hiatal hernia.   - Mild antral gastritis. Biopsied  - Incidental non-bleeding duodenal diverticulum. The duodenum was otherwise unremarkable.   - No endoscopic esophageal abnormality to explain patient's dysphagia. Esophagus dilated.     FINAL DIAGNOSIS   A. STOMACH, COLD BIOPSY:   --MILD CHRONIC NONSPECIFIC GASTRITIS, NO HELICOBACTER IDENTIFIED.     B, C. DISTAL AND MID ESOPHAGUS, COLD BIOPSIES:   --SQUAMOUS EPITHELIUM, NO SIGNIFICANT HISTOPATHOLOGICAL ABNORMALITIES.           Review of Systems   Constitutional:  Negative for chills, diaphoresis, fatigue and fever.   HENT:  Negative for congestion, ear pain, hearing loss, sneezing and sore throat.    Eyes:  Negative for photophobia, pain and visual disturbance.   Respiratory:  Negative for cough, shortness of breath and wheezing.    Cardiovascular:  Negative for chest pain, palpitations and leg swelling.   Endocrine: Negative for cold intolerance and heat intolerance.   Genitourinary:  Negative for dysuria, flank pain, frequency and hematuria.   Musculoskeletal:  Negative for arthralgias, back pain, gait problem, joint swelling and myalgias.   Skin:  Negative for rash.   Neurological:  Negative for dizziness, syncope, weakness, light-headedness, numbness and headaches.   Hematological:  Negative for adenopathy.   Psychiatric/Behavioral:  Negative for agitation and hallucinations.        Allergies   Allergen Reactions    Cefprozil Unknown    Cephalosporins Hives     ceftin    Latex Unknown     Current  Outpatient Medications   Medication Sig Dispense Refill    Ajovy Syringe 225 mg/1.5 mL prefilled syringe Inject 1.5 mL (225 mg) under the skin every 30 (thirty) days.      albuterol 2.5 mg /3 mL (0.083 %) nebulizer solution inhale 3 ml (1 vial) via nebulizer every 6 hours as needed for shortness of breath.      aspirin 81 mg capsule Take 1 tablet by mouth once daily.      cholecalciferol (Vitamin D-3) 125 MCG (5000 UT) capsule Take by mouth once daily.      cyanocobalamin, vitamin B-12, (VITAMIN B-12 ORAL) Take by mouth once daily.      esomeprazole (NexIUM) 40 mg DR capsule TAKE ONE CAPSULE (40 MG) BY MOUTH ONCE DAILY 90 capsule 0    ezetimibe (Zetia) 10 mg tablet Take 1 tablet (10 mg) by mouth once daily. 90 tablet 2    rizatriptan (Maxalt) 10 mg tablet TAKE AT ONSET OF HEADACHE  MAY REPEAT DOSE ONCE  NOT TO EXCEED 2 DAYS PER WEEK.      rosuvastatin (Crestor) 20 mg tablet Take 1 tablet (20 mg) by mouth once daily. 90 tablet 2    Symbicort 80-4.5 mcg/actuation inhaler Inhale 2 puffs if needed.      Ventolin HFA 90 mcg/actuation inhaler Inhale 2 puffs every 4 hours if needed.      MAGNESIUM OXIDE ORAL Take 250 mg by mouth once daily.       No current facility-administered medications for this visit.        Objective     /89 (BP Location: Right arm, Patient Position: Sitting)   Pulse 67   Temp 35 °C (95 °F) (Temporal)   Wt 98.9 kg (218 lb)   SpO2 97% Comment: RA  BMI 37.42 kg/m²     Physical Exam  Constitutional:       General: She is not in acute distress.     Appearance: Normal appearance.   HENT:      Head: Normocephalic and atraumatic.   Eyes:      Conjunctiva/sclera: Conjunctivae normal.   Cardiovascular:      Rate and Rhythm: Normal rate and regular rhythm.      Heart sounds: No murmur heard.     No gallop.   Pulmonary:      Effort: Pulmonary effort is normal.      Breath sounds: Normal breath sounds.   Abdominal:      General: Bowel sounds are normal. There is no distension.      Tenderness: There  is no abdominal tenderness. There is no guarding.   Musculoskeletal:         General: No swelling or deformity. Normal range of motion.      Cervical back: Normal range of motion. No rigidity.   Skin:     General: Skin is warm and dry.      Coloration: Skin is not jaundiced.      Findings: No lesion or rash.   Neurological:      General: No focal deficit present.      Mental Status: She is alert and oriented to person, place, and time.   Psychiatric:         Mood and Affect: Mood normal.      Comments: Tearful at times when discussing 's death; otherwise normal affect          Assessment/Plan   Problem List Items Addressed This Visit       Odynophagia    Relevant Orders    EGD    Globus sensation    Relevant Orders    EGD     Other Visit Diagnoses       Esophageal dysphagia    -  Primary    Relevant Orders    EGD    Heartburn               Globus sensation, subjective tongue swelling, heartburn, odynophagia, esophageal dysphagia to solids: etiology not entirely clear. History somewhat vague. Potentially multifactorial in nature. Suspect GERD in the setting of improper PPI use is at least somewhat contributing. Also consider esophagitis, peptic stricture and esophageal malignancy with the latter less likely. I could not appreciate any tongue swelling on exam. I do question some functional component to her symptomatology, especially globus sensation,  in the setting of stress regarding her 's recent death.   - will proceed with EGD   - continue esomeprazole 40 mg once daily (advise to take 30-60 minutes prior to breakfast)  - discuss dietary precautions and weight loss in the management of GERD     2. Colorectal cancer screening:  - recommend screening colonoscopy in 2029    3. Follow up:  - will be based upon the above

## 2024-04-02 NOTE — PATIENT INSTRUCTIONS
Thanks for coming to the GI clinic.     I would like you to get an EGD. Please call [phone].    Continue esomeprazole 40 mg once daily (to be taken 30-60 minutes prior to breakfast).     Try to adhere to acid reflux dietary precautions.     You will be due for a colonoscopy in 2029.     Follow up will be based upon the above.

## 2024-04-17 ENCOUNTER — ANESTHESIA EVENT (OUTPATIENT)
Dept: GASTROENTEROLOGY | Facility: EXTERNAL LOCATION | Age: 64
End: 2024-04-17

## 2024-04-26 ENCOUNTER — ANESTHESIA (OUTPATIENT)
Dept: GASTROENTEROLOGY | Facility: EXTERNAL LOCATION | Age: 64
End: 2024-04-26

## 2024-04-26 ENCOUNTER — APPOINTMENT (OUTPATIENT)
Dept: GASTROENTEROLOGY | Facility: EXTERNAL LOCATION | Age: 64
End: 2024-04-26
Payer: COMMERCIAL

## 2024-05-03 ENCOUNTER — APPOINTMENT (OUTPATIENT)
Dept: GASTROENTEROLOGY | Facility: EXTERNAL LOCATION | Age: 64
End: 2024-05-03
Payer: COMMERCIAL

## 2024-05-26 DIAGNOSIS — K29.40 ATROPHIC GASTRITIS WITHOUT HEMORRHAGE: ICD-10-CM

## 2024-05-28 RX ORDER — ESOMEPRAZOLE MAGNESIUM 40 MG/1
40 CAPSULE, DELAYED RELEASE ORAL DAILY
Qty: 90 CAPSULE | Refills: 0 | Status: SHIPPED | OUTPATIENT
Start: 2024-05-28

## 2024-05-30 ENCOUNTER — PATIENT OUTREACH (OUTPATIENT)
Dept: PRIMARY CARE | Facility: CLINIC | Age: 64
End: 2024-05-30
Payer: COMMERCIAL

## 2024-05-30 NOTE — PROGRESS NOTES
Spoke with patient. She is seeing orthopedic Dr today and she is in the process of changing to a new PCP. She reports she has appt scheduled for this month.

## 2024-07-31 DIAGNOSIS — I25.84 CORONARY ARTERY DISEASE DUE TO CALCIFIED CORONARY LESION: ICD-10-CM

## 2024-07-31 DIAGNOSIS — I25.10 CORONARY ARTERY DISEASE DUE TO CALCIFIED CORONARY LESION: ICD-10-CM

## 2024-07-31 DIAGNOSIS — E78.2 MIXED HYPERLIPIDEMIA: ICD-10-CM

## 2024-07-31 RX ORDER — EZETIMIBE 10 MG/1
10 TABLET ORAL DAILY
Qty: 90 TABLET | Refills: 0 | Status: SHIPPED | OUTPATIENT
Start: 2024-07-31

## 2024-07-31 RX ORDER — ROSUVASTATIN CALCIUM 20 MG/1
20 TABLET, COATED ORAL DAILY
Qty: 90 TABLET | Refills: 0 | Status: SHIPPED | OUTPATIENT
Start: 2024-07-31

## 2024-08-28 DIAGNOSIS — K29.40 ATROPHIC GASTRITIS WITHOUT HEMORRHAGE: ICD-10-CM

## 2024-08-29 RX ORDER — ESOMEPRAZOLE MAGNESIUM 40 MG/1
40 CAPSULE, DELAYED RELEASE ORAL DAILY
Qty: 90 CAPSULE | Refills: 0 | Status: SHIPPED | OUTPATIENT
Start: 2024-08-29

## 2024-11-19 ENCOUNTER — TELEPHONE (OUTPATIENT)
Dept: OBSTETRICS AND GYNECOLOGY | Facility: CLINIC | Age: 64
End: 2024-11-19

## 2024-11-19 ENCOUNTER — OFFICE VISIT (OUTPATIENT)
Dept: OBSTETRICS AND GYNECOLOGY | Facility: CLINIC | Age: 64
End: 2024-11-19
Payer: COMMERCIAL

## 2024-11-19 VITALS
OXYGEN SATURATION: 98 % | HEIGHT: 64 IN | BODY MASS INDEX: 33.12 KG/M2 | HEART RATE: 75 BPM | RESPIRATION RATE: 16 BRPM | SYSTOLIC BLOOD PRESSURE: 133 MMHG | TEMPERATURE: 98.1 F | WEIGHT: 194 LBS | DIASTOLIC BLOOD PRESSURE: 85 MMHG

## 2024-11-19 DIAGNOSIS — N30.10 IC (INTERSTITIAL CYSTITIS): ICD-10-CM

## 2024-11-19 DIAGNOSIS — Z13.89 SCREENING FOR BLOOD OR PROTEIN IN URINE: ICD-10-CM

## 2024-11-19 DIAGNOSIS — N39.0 RECURRENT UTI: ICD-10-CM

## 2024-11-19 DIAGNOSIS — R39.9 UTI SYMPTOMS: Primary | ICD-10-CM

## 2024-11-19 DIAGNOSIS — R31.0 GROSS HEMATURIA: ICD-10-CM

## 2024-11-19 LAB
POC APPEARANCE, URINE: CLEAR
POC BILIRUBIN, URINE: NEGATIVE
POC BLOOD, URINE: ABNORMAL
POC COLOR, URINE: YELLOW
POC GLUCOSE, URINE: NEGATIVE MG/DL
POC KETONES, URINE: NEGATIVE MG/DL
POC LEUKOCYTES, URINE: ABNORMAL
POC NITRITE,URINE: NEGATIVE
POC PH, URINE: 6 PH
POC PROTEIN, URINE: NEGATIVE MG/DL
POC SPECIFIC GRAVITY, URINE: <=1.005
POC UROBILINOGEN, URINE: 0.2 EU/DL

## 2024-11-19 PROCEDURE — 99213 OFFICE O/P EST LOW 20 MIN: CPT | Performed by: NURSE PRACTITIONER

## 2024-11-19 PROCEDURE — 99203 OFFICE O/P NEW LOW 30 MIN: CPT | Performed by: NURSE PRACTITIONER

## 2024-11-19 PROCEDURE — 81003 URINALYSIS AUTO W/O SCOPE: CPT | Mod: QW | Performed by: NURSE PRACTITIONER

## 2024-11-19 PROCEDURE — 3008F BODY MASS INDEX DOCD: CPT | Performed by: NURSE PRACTITIONER

## 2024-11-19 RX ORDER — PHENAZOPYRIDINE HYDROCHLORIDE 100 MG/1
100 TABLET, FILM COATED ORAL 3 TIMES DAILY PRN
Qty: 10 TABLET | Refills: 1 | Status: SHIPPED | OUTPATIENT
Start: 2024-11-19

## 2024-11-19 RX ORDER — TRAZODONE HYDROCHLORIDE 50 MG/1
50 TABLET ORAL NIGHTLY
COMMUNITY
Start: 2024-01-30

## 2024-11-19 ASSESSMENT — ENCOUNTER SYMPTOMS
LOSS OF SENSATION IN FEET: 0
OCCASIONAL FEELINGS OF UNSTEADINESS: 0
DEPRESSION: 0

## 2024-11-19 ASSESSMENT — PAIN SCALES - GENERAL: PAINLEVEL_OUTOF10: 0-NO PAIN

## 2024-11-19 NOTE — PROGRESS NOTES
"Cynthia Moran is a 63 y.o. here for pressure to void and frequent urination without dysuria. Questionable hematuria because she did have bright, red blood on the toilet paper. She's had similar sxs 3x in the past year.     Chief Complaint: bladder pain     Urine Cx:  - 23: Escherichia coli >100,000 CFU/ML    OB/GYN History:   -   - Number of prior vaginal deliveries: 0  - Number of prior c-sections: 2  - Menopausal: Yes  - Sexually active:  No, no partner    Prolapse symptoms:   - denies prolapse symptoms     Urinary symptoms:   - This is the 4th or 5th time this year she's felt like she had a UTI without a positive culture.  - 1 positive culture within the past year, bladder symptoms returned to baseline once complete with abx  - Her UTI sxs started last year when she was stressed and taking care of her .   - Azo has helped with her bladder pain.   - ELI: Yes, with a strong laugh or sneeze   - UUI: Significant  - Frequency: Not bothersome at baseline   - Nocturia 2-3x/night  - She takes Vit D, magnesium, and B12 supplements. Magnesium and B12 supplements are new.     Hematuria:   - Patient had bleeding as recently as yesterday. She reports having a pelvic exam to rule out vaginal bleeding. She saw blood when wiping which was present for about 2 hours  - Mentioned hematuria to GYN who r/o vaginal bleeding and sent referral to uro. Patient says they did a urine test that was \"negative for carcinoma. \"  - Gross hematuria has been present multiple times over the past year, not correlated with a UTI  - Risk Factors: Denies hx of smoking or working in a factory with chemicals. Father with hx of bladder cancer. Denies hx of kidney stones.    Bowels  - BMs daily, reports soft and easy to pass    Health Maintenance  - Mammogram up to date: Yes - last on 24   - Colonoscopy up to date: Yes - last on 19     Past medical and surgical hx reviewed - pertinent for CAD, HLD    Physical Exam  No LMP " "recorded.  Body mass index is 33.3 kg/m².  /85   Pulse 75   Temp 36.7 °C (98.1 °F) (Temporal)   Resp 16   Ht 1.626 m (5' 4\")   Wt 88 kg (194 lb)   SpO2 98%   BMI 33.30 kg/m²   General Appearance: well appearing  Neuro: Alert and oriented      Pelvic:  Genitourinary: normal external genitalia, Bartholin's glands negative, Sweetwater's glands negative  Urethra: normal meatus, non-tender, no periurethral mass  Vaginal mucosa  normal, no evidence of bleeding in the vagina  Cervix normal  Uterus  normal size, non-tender, mobile  Adnexae  negative nontender, no masses    Perianal: no hemorrhoids, fissures or masses    PVR (by Ultrasound): 33 ml  Physical Exam  Genitourinary:      Genitourinary Comments: No prolapse.    POP-Q measurements were:      Aa: Ba: C:      gH: 2.5, pB: TVL:      Ap: Bp: D:     Assessment and Plan  63 y.o. female being assessed for hx of gross hematuria and possible IC. Co morbidities: CAD, HLD.     Diagnoses:   #1 Hx of gross hematuria   #2 UTI sxs/bladder pain without +cultures      Plan:   1. Hx of gross hematuria   - Small blood on UA today.   - Sent for UA micro and culture for further evaluation.   - We discussed the possibility of her having a cystoscopy done in the future to further evaluate the inside of her bladder. Plan to discuss whether patient requires a cystoscopy with Dr. Grove.   - Denies hx of smoking or working in a factory with chemicals. Father with hx of bladder cancer. Denies hx of kidney stones or trauma to the back.     2. UTI sxs/bladder pain without +cultures   - Counseled her on the possibility of IC based on her UTI sxs without +urine cultures.   - Common bladder triggers are caffeine, carbonation, sugar, dairy, or citrus. Other common IC irritants are stress and anxiety.   - Sent rx for Pyridium. Take this 2-3x daily when she is in a flare. If her sxs do not resolve with this medication, then her urine should be cultured to rule out infection.   - She can " also take a teaspoon of baking soda in water for bladder irritation. Do not take it daily as it will spike sodium.   - Patient was given www.ichelp.org for information on the elimination diet. Discussed pausing potential bladder irritants for 2 weeks and then re introducing each possible irritant.   - PFPT requisition sent today. Given list of physical therapists with their corresponding locations/contact information. Counseled patient that some women carry stress/tension in their pelvic floor.      Will follow up with patient's UA micro results.     Scribe Attestation:   Thea RIVERA, am scribing for virtually, and in the presence of Melia Hoff, TORY on 11/19/2024 at 4:48 PM.

## 2024-11-19 NOTE — TELEPHONE ENCOUNTER
Pyridium clarification given to pharmacist for#9 tabs, take 1 TID PRN for bladder spasms with 1 RF.

## 2024-11-19 NOTE — PATIENT INSTRUCTIONS
"To reach the Urogynecology nurses for Dr. Grove and Melia Hoff, call [phone]. You will then select option 2 to be connected to the your provider's office and then option 3 for \"[facility]\". This will connect you with [facility] or [facility].     Elimination diet information: www.ichelp.org      "

## 2024-11-21 ENCOUNTER — DOCUMENTATION (OUTPATIENT)
Dept: OBSTETRICS AND GYNECOLOGY | Facility: CLINIC | Age: 64
End: 2024-11-21
Payer: COMMERCIAL

## 2024-11-21 DIAGNOSIS — N39.0 RECURRENT UTI: Primary | ICD-10-CM

## 2024-11-21 RX ORDER — ESTRADIOL 0.1 MG/G
CREAM VAGINAL
Qty: 42.5 G | Refills: 5 | Status: SHIPPED | OUTPATIENT
Start: 2024-11-21

## 2024-11-21 RX ORDER — METHENAMINE HIPPURATE 1000 MG/1
1 TABLET ORAL 2 TIMES DAILY
Qty: 180 TABLET | Refills: 3 | Status: SHIPPED | OUTPATIENT
Start: 2024-11-21 | End: 2025-11-21

## 2024-11-21 NOTE — PROGRESS NOTES
Spoke with patient to advise her that UA was never submitted. She informed me that her culture from [facility] returned positive for E coli 10-50ku. This is not available in care everywhere at this time. With this, she has three culture proven UTIs in the past year. She would like to do something for UTI prevention. I rx vaginal estrogen as well as Methenamine for prevention. We will do a follow p in 2 months for a progress check.

## 2024-11-25 DIAGNOSIS — N39.0 RECURRENT UTI: ICD-10-CM

## 2024-11-25 RX ORDER — ESTRADIOL 0.1 MG/G
CREAM VAGINAL
Qty: 42.5 G | Refills: 5 | Status: SHIPPED | OUTPATIENT
Start: 2024-11-25

## 2024-11-26 ENCOUNTER — TELEPHONE (OUTPATIENT)
Dept: OBSTETRICS AND GYNECOLOGY | Facility: CLINIC | Age: 64
End: 2024-11-26
Payer: COMMERCIAL

## 2024-11-29 DIAGNOSIS — K29.40 ATROPHIC GASTRITIS WITHOUT HEMORRHAGE: ICD-10-CM

## 2024-12-02 RX ORDER — ESOMEPRAZOLE MAGNESIUM 40 MG/1
40 CAPSULE, DELAYED RELEASE ORAL DAILY
Qty: 90 CAPSULE | Refills: 0 | Status: SHIPPED | OUTPATIENT
Start: 2024-12-02

## 2025-01-28 ENCOUNTER — TELEMEDICINE (OUTPATIENT)
Dept: OBSTETRICS AND GYNECOLOGY | Facility: CLINIC | Age: 65
End: 2025-01-28
Payer: COMMERCIAL

## 2025-01-28 DIAGNOSIS — N32.81 OAB (OVERACTIVE BLADDER): ICD-10-CM

## 2025-01-28 DIAGNOSIS — N39.0 RECURRENT UTI: Primary | ICD-10-CM

## 2025-01-28 ASSESSMENT — PAIN SCALES - GENERAL: PAINLEVEL_OUTOF10: 0-NO PAIN

## 2025-01-28 ASSESSMENT — PATIENT HEALTH QUESTIONNAIRE - PHQ9
SUM OF ALL RESPONSES TO PHQ9 QUESTIONS 1 AND 2: 0
2. FEELING DOWN, DEPRESSED OR HOPELESS: NOT AT ALL
1. LITTLE INTEREST OR PLEASURE IN DOING THINGS: NOT AT ALL

## 2025-01-28 ASSESSMENT — ENCOUNTER SYMPTOMS
OCCASIONAL FEELINGS OF UNSTEADINESS: 0
DEPRESSION: 0
LOSS OF SENSATION IN FEET: 0

## 2025-01-28 NOTE — PROGRESS NOTES
"HISTORY OF PRESENT ILLNESS:  Cynthia Moran is a 64 y.o. female, who presents in follow up for Yomaira.      During last encounter on 11/19/24, reviewed and agreed to the following: Ymoaira - 2 positive cultures in the past year. Started Methenamine and vaginal estrogen.     Today she reports   Urinary Symptoms:   - She feels like it took a month for her UTI sxs to resolve.   - Patient constantly feels the urge to go to the bathroom and explains that \"her stomach feels tight\".   - Endorses nocturia 3-4x per night.   - Denies bladder pain.   - She is taking Methenamine BID with Vit C and is using the tv estrogen cream.   - Endorses a strong sense of urinary urgency. She can't drive for long distances without stopping to void.   - She forgot about PFPT, but wants to attend.   - Denies starting any new supplements.   - The first month of taking Methenamine, she did feel symptomatic for a UTI. She took Azo and then her sxs resolved.   - Uses a bidet at home to clean after using the toilet. If she goes a day or so without using the bidet, she feels heaviness - (if patient likes her bidet, she can use a андрей bottle such as the [employer] mom brand when she is traveling.)  - She's been doing Kegel exercises the past few days.     Pelvic Symptoms:   - Endorses hx of dyspareunia with dryness. Denies hx of deeper belly pain.     Interval History:  - She is on Mounjaro and lost 20 more lbs. Her diet has changed since she is eating healthier. She takes Mounjaro once weekly and started this in July.   - Her  passed away last February and the anniversary of his death has been stressful.       IMPRESSION AND PLAN:  Cynthia Moran is a 64 y.o. who is being treated for OAB and Yomaira. Co morbidities: CAD, HLD.     Plan    1. OAB  - Encouraged her to attend PFPT. She may have a portion of myofascial tension as she is describing that her abdomen \"feels tight\".   - Patient may stop Methenamine for one week and see if her recent onset of OAB " sxs resolve. If her sxs persist while off the Methenamine, we can start an OAB medication. We would consider Myrbetriq. Discussed possible side effects including slightly elevated blood pressure, urinary retention, or postnasal drip. Send a [employer] message if she wants to start daily Myrbetriq.   - It is fine for her to continue taking Pyridium. It will not help with frequency, but can help with bladder irritation/pain.     2. Recurrent UTI   - Continue tv estrogen cream 2x/week.   - She will stop Methenamine for one week and see if her recent onset of OAB sxs resolve.      All questions and concerns were answered and addressed.  The patient expressed understanding and agrees with the plan.     Phone call visit today: The patient's identity was confirmed and that she is located in [place]. TORY Escalera identified herself and the patient consented to a telehealth visit today. Phone call time: 22 minutes     Follow-up with TORY Escalera.     Scribe Attestation:   IThea, am scribing for virtually, and in the presence of TORY Escalera on 01/28/2025 at 2:41 PM.     I, Melia Hoff, personally performed the services described in the documentation as scribed in my presence and confirm it is both complete and accurate.

## 2025-02-13 NOTE — PROGRESS NOTES
"PELVIC FLOOR PHYSICAL THERAPY EVALUATION AND TREATMENT    Patient Name: Cynthia Moran  MRN: [id number]  Today's Date: 2/13/2025       INSURANCE:  Visit number: 1  Payor: KAILYN / Plan: KAILYN HMP / Product Type: *No Product type* /   $20 COPAY 30 PT/OT V PCY 0 USED NO AUTH NEEDED PAYS % OOP 9450.00 [phone] APPLIED   Referred by: Melia Hoff A*                              ASSESSMENT:     Cynthia Moran  is a 64 y.o. old patient who participated in a pelvic floor physical therapy evaluation today due to ***. Pt presents with signs and symptoms consistent with ***{amcurinaryincontinencetypes:59160};{amcpelvicpaindx:32671}; {amcotherpelvicdx:11076}.  her impairments include: {amcbladdersx:86807}; {amcpelvicpainimpairments:13181}; {amcotherimpairments:45879}. Due to these impairments, she has the following functional limitations and participation restrictions: {amcpfrestrictions:98566}. Cynthia Moran 's clinical presentation is {talClinicalPresentation:07721::\"Stable and/or uncomplicated characteristics\"} with the level of complexity being {talcomplexity:44364::\"low\"}.  her potential for rehab is {talprognosis:73103::\"good\"}. Skilled physical therapy services are appropriate and beneficial in order to achieve measurable and meaningful change in stated objective tests and measures. Utilization of skilled physical therapy services will aid in advancing her functional status and attaining her therapy-related goals. The patient verbalized understanding and is in agreement with all goals and plan of care. Plan of care was developed with input and agreement by the patient    PLAN:    NV: ***      THERAPY DIAGNOSIS:  No diagnosis found.    SUBJECTIVE:  Cynthia Moran reports abdominal tightness x ***. Known hx of Yomaira and OAB.     Sx worse with:  Sx better with:  Prior treatments/interventions:   Pt stated goals: \"***\"    Pain location: ***  Pain description: ***  Pain rating: ***/10 current; ***/10 at worst; " "***/10 at best    PLOF: ***  CLOF: ***    Functional limitations: ***  Home Environment: ***  Occupation: ***      Pelvic Pain  Description: ***  Location: ***  Duration: ***  Frequency: ***  Since onset, the symptoms are: ***  Pain when emptying bladder: ***  Pain with wiping or tight clothing: ***  Pain with intercourse: ***  History of back pain: ***    Bladder  Excessive Urinary Urgency: ***  Daytime Voiding Frequency: ***  Nighttime Voiding Frequency: ***  Unintentional urine loss frequency: ***  Leakage occurs with: ***   Leakage amount: ***  Difficulty initiating flow of urine: ***  Slow/weak urine stream: ***  Difficulty starting urine stream/push to urinate: ***  Able to completely empty bladder: ***  Tests performed by doctor: ***  Frequent UTI's: ***    Bowel Screen  BM frequency: ***  Frequent diarrhea: ***  Frequent constipation/straining/incomplete emptying: ***  Excessive Bowel Urgency: ***    Exercise  Current exercise regime: ***  Do you do Kegels? ***       MEDICAL HISTORY FORM:  Reviewed (scanned into chart)  Denies unexpected weight loss/gain, night sweats, or night pain  Previous surgeries include: ***      FALL RISK: {Fall Risk:69998::\"None\"}   PRECAUTIONS: (+)***. Denies CA, pacemaker, DM, HTN, blood thinner medication use, latex allergy, epilepsy/seizures, or other known cardio/neuro/pulmonary problems       OBJECTIVE  Patient reviewed and signed, along with treating physical therapist, the Patient Release Form Concerning Chaperones During Intimate Examinations. This form allows the physical therapist to perform a pelvic internal physical examination and treatment *** the presence of a chaperone. Please see scanned form under media.     Voluntary Pelvic Floor Contraction  {amcvoluntarypfcontraction:99728}    Voluntary Pelvic Floor Relaxation  {amcpfvoluntaryrelaxation:46937}    Pelvic Floor MMT Grade  0/zero: no palpable contraction/squeeze  1/trace: flicker of squeeze or contraction  2/poor: " "squeeze pressure asymmetrical or felt at various points- no lift or displacement  3/fair: squeeze pressure (contraction) and lift or displacement  4/good: squeeze pressure (contraction) and lift or displacement from anterior, posterior, and side walls  5/strong: full circumference of finger compressed, displaced with an inward pull    [place] PERF(Power/Endurance/Repetitions/Fast Twitch)  ***/***/***/***    Pelvic Floor Clock: (+) Pain and/or Tightness  {[facility]:09025:::1}    LE MMT: L and R  Hip flex: ***/5 and ***/5  Hip ext: ***/5 and ***/5  Hip ABD: ***/5 and ***/5  Hip ADD: ***/5 and ***/5  Knee ext: ***/5 and ***/5  Knee flex: ***/5 and ***/5  Ankle DF: ***/5 and ***/5  Ankle PF: ***/5 and ***/5    ROM: L and R  Lumbar flex: ***  Lumbar ext: ***  Lumbar SB *** and ***  Lumbar rot: *** and ***  Hip flex: *** and ***  Hip ext: *** and ***  Hip IR: ***and ***  Hip ER: *** and ***    Flexibility: L and R  Hamstrings: *** and ***  Psoas: *** and ***  Quads: *** and ***    External Musculature Palpation: L and R  Iliopsoas: *** pain/tightness and *** pain/tightness   Adductor: *** pain/tightness and *** pain/tightness   QL: *** pain/tightness and *** pain/tightness  Glutes: *** pain/tightness and *** pain/tightness  Abdominals: *** pain/tightness and *** pain/tightness      FUNCTIONAL ASSESSMENTS  Breathing Assessment  Seated posture: ***  Natural breathing: normal or dysfunctional due to ***  Deep breathing: normal or dysfunctional due to ***  Symmetry between L and R side: yes or no ***  Able to maintain breath work in various postures (supine vs sitting vs standing): yes or no ***    Diastasis Examination (performed in hooklying)  Linea alba at rest: {amclineaalbarest:10511}  Linea alba with abdominal curl up: {amcabdominalcurlup:35790}  Cueing to activate deep abdominal muscles or pelvic floor *** improved/did not improve gap    Supine Active SLR  Compensatory patterns ***  Breath holding ***  \"Rock and roll\" " of pelvis ***  Pain or reproduction of sx ***  Sense of heaviness or weakness ***  Compression test at hips/lower TrA, upper rectus, and ribs for breakdown of difficulty with ASLR***    SLS  Balance***  Postural strategy (ankle, hips, stepping, UE support)***  Activation & elevation of WB side ***present vs not present indicating ***appropriate or difficulty with load transfers    Overhead Reach Test  Upper abdominal stability ***present vs not present  Ribcage aligned vs not aligned over hips***      OUTCOME MEASURES  {PT Outcome Measures:28078}      TREATMENTS:  Therapeutic Exercise x *** minutes, *** unit     Therapeutic Activity x *** minutes, *** unit   Education provided on the following concepts with [facility] handouts provided:  {amcpelviceducation:45515}  Neuromuscular Re-Education x *** minutes, *** unit     Gait Training x *** minutes, *** unit     Manual Therapy x *** minutes, *** unit     Canalith Repositioning x *** minutes (untimed), *** unit     Modalities x *** minutes, *** unit       EDUCATION:  {Education:80698}        GOALS:   *** amcincontinencegoals  *** amcpelvicpaingoals  *** amcprolapsegoals

## 2025-02-17 ENCOUNTER — TELEPHONE (OUTPATIENT)
Dept: OBSTETRICS AND GYNECOLOGY | Facility: CLINIC | Age: 65
End: 2025-02-17
Payer: COMMERCIAL

## 2025-02-17 ENCOUNTER — APPOINTMENT (OUTPATIENT)
Dept: PHYSICAL THERAPY | Facility: CLINIC | Age: 65
End: 2025-02-17
Payer: COMMERCIAL

## 2025-02-19 ENCOUNTER — TELEPHONE (OUTPATIENT)
Dept: OBSTETRICS AND GYNECOLOGY | Facility: CLINIC | Age: 65
End: 2025-02-19
Payer: COMMERCIAL

## 2025-02-19 DIAGNOSIS — R39.9 UTI SYMPTOMS: ICD-10-CM

## 2025-02-19 NOTE — TELEPHONE ENCOUNTER
Pt sent [facility] message c/o UTI sx. Urine cx ordered. Pt notified via [facility] reply. Will monitor for results.

## 2025-02-23 LAB — BACTERIA UR CULT: ABNORMAL

## 2025-02-24 ENCOUNTER — TELEPHONE (OUTPATIENT)
Dept: OBSTETRICS AND GYNECOLOGY | Facility: CLINIC | Age: 65
End: 2025-02-24
Payer: COMMERCIAL

## 2025-02-24 DIAGNOSIS — N39.0 RECURRENT UTI: Primary | ICD-10-CM

## 2025-02-24 DIAGNOSIS — N39.0 ACUTE UTI: Primary | ICD-10-CM

## 2025-02-24 RX ORDER — SULFAMETHOXAZOLE AND TRIMETHOPRIM 800; 160 MG/1; MG/1
1 TABLET ORAL 2 TIMES DAILY
Qty: 10 TABLET | Refills: 0 | Status: SHIPPED | OUTPATIENT
Start: 2025-02-24 | End: 2025-03-01

## 2025-02-24 RX ORDER — ESTRADIOL 10 UG/1
10 TABLET, FILM COATED VAGINAL 2 TIMES WEEKLY
Qty: 8 TABLET | Refills: 11 | Status: SHIPPED | OUTPATIENT
Start: 2025-02-24 | End: 2026-02-24

## 2025-02-24 NOTE — TELEPHONE ENCOUNTER
----- Message from Melia Hoff sent at 2/24/2025  8:09 AM EST -----  I sent bactrim to [facility]... bid x 5 days. Could you let her know?

## 2025-02-24 NOTE — TELEPHONE ENCOUNTER
Pt returned call to office.   Pt aware has + urine culture result and needs to take bactrim ds.   Pt is asking if vagifem is an option instead of vaginal estrcce cream.  Nurse will make kathleen aware.

## 2025-02-24 NOTE — TELEPHONE ENCOUNTER
Attempted to reach pt by phone.   Got her voice mail.   Detailed message left stating she has a UTI and kathleen sent in an antibiotic for her.  Instructed to call office back to confirm she received this message.

## 2025-06-25 ENCOUNTER — TELEPHONE (OUTPATIENT)
Dept: OBSTETRICS AND GYNECOLOGY | Facility: CLINIC | Age: 65
End: 2025-06-25
Payer: COMMERCIAL

## 2025-06-25 DIAGNOSIS — R35.0 URINARY FREQUENCY: ICD-10-CM

## 2025-06-25 NOTE — TELEPHONE ENCOUNTER
Pt contacted.  C/o urinary frequwncy and pressure.  Denies painful urination.  Lab order placed in system for urine culture.  Await result.

## 2025-06-29 LAB — BACTERIA UR CULT: NORMAL

## 2025-06-30 ENCOUNTER — TELEPHONE (OUTPATIENT)
Dept: OBSTETRICS AND GYNECOLOGY | Facility: CLINIC | Age: 65
End: 2025-06-30

## 2025-06-30 ENCOUNTER — TELEPHONE (OUTPATIENT)
Dept: OBSTETRICS AND GYNECOLOGY | Facility: CLINIC | Age: 65
End: 2025-06-30
Payer: COMMERCIAL

## 2025-06-30 DIAGNOSIS — R35.0 URINARY FREQUENCY: ICD-10-CM

## 2025-06-30 NOTE — TELEPHONE ENCOUNTER
"Pt returned call to office.  Pt still feeling \" a little bit of pressure still\"   denies painful urination.   Pt endorses not drinking her typical amount of fluids at time of collection.  Pt will drink more water and reassess after one week.  Pt informed an order has been placed in system for her to go to lab in future, prn.      "

## 2025-06-30 NOTE — TELEPHONE ENCOUNTER
----- Message from Nurse Yanelis MATTHEWS sent at 6/25/2025  2:41 PM EDT -----  Regarding: check urine culture result  Melia herrera    Symptoms were frequency and pressure.   Look for final result.

## 2025-06-30 NOTE — TELEPHONE ENCOUNTER
Left message for pt to call office.  RE:   let pt know her urine culture is not indicative of a uti.  If still having same symptoms, pt to resubmit urine sample.  Await call back to discuss.

## 2025-08-14 ENCOUNTER — APPOINTMENT (OUTPATIENT)
Dept: OBSTETRICS AND GYNECOLOGY | Facility: CLINIC | Age: 65
End: 2025-08-14
Payer: COMMERCIAL

## 2025-08-16 LAB — BACTERIA UR CULT: NORMAL

## 2025-08-22 ENCOUNTER — OFFICE VISIT (OUTPATIENT)
Dept: OBSTETRICS AND GYNECOLOGY | Facility: CLINIC | Age: 65
End: 2025-08-22
Payer: COMMERCIAL

## 2025-08-22 VITALS
TEMPERATURE: 98.1 F | HEART RATE: 77 BPM | SYSTOLIC BLOOD PRESSURE: 127 MMHG | OXYGEN SATURATION: 98 % | WEIGHT: 178 LBS | DIASTOLIC BLOOD PRESSURE: 71 MMHG | BODY MASS INDEX: 30.39 KG/M2 | HEIGHT: 64 IN

## 2025-08-22 DIAGNOSIS — R39.89 URETHRAL PAIN: Primary | ICD-10-CM

## 2025-08-22 PROCEDURE — 3008F BODY MASS INDEX DOCD: CPT | Performed by: STUDENT IN AN ORGANIZED HEALTH CARE EDUCATION/TRAINING PROGRAM

## 2025-08-22 PROCEDURE — 99214 OFFICE O/P EST MOD 30 MIN: CPT | Performed by: STUDENT IN AN ORGANIZED HEALTH CARE EDUCATION/TRAINING PROGRAM

## 2025-08-22 PROCEDURE — 1036F TOBACCO NON-USER: CPT | Performed by: STUDENT IN AN ORGANIZED HEALTH CARE EDUCATION/TRAINING PROGRAM

## 2025-08-22 RX ORDER — AMITRIPTYLINE HYDROCHLORIDE 10 MG/1
10 TABLET, FILM COATED ORAL NIGHTLY
COMMUNITY
Start: 2025-08-12

## 2025-08-22 RX ORDER — ROSUVASTATIN CALCIUM 10 MG/1
TABLET, COATED ORAL
COMMUNITY
Start: 2025-08-16

## 2025-08-22 ASSESSMENT — ENCOUNTER SYMPTOMS
DEPRESSION: 0
LOSS OF SENSATION IN FEET: 0
OCCASIONAL FEELINGS OF UNSTEADINESS: 0

## 2025-08-22 ASSESSMENT — PAIN SCALES - GENERAL: PAINLEVEL_OUTOF10: 0-NO PAIN

## 2025-10-14 ENCOUNTER — APPOINTMENT (OUTPATIENT)
Dept: PRIMARY CARE | Facility: CLINIC | Age: 65
End: 2025-10-14
Payer: COMMERCIAL